# Patient Record
Sex: MALE | Race: BLACK OR AFRICAN AMERICAN | Employment: OTHER | URBAN - METROPOLITAN AREA
[De-identification: names, ages, dates, MRNs, and addresses within clinical notes are randomized per-mention and may not be internally consistent; named-entity substitution may affect disease eponyms.]

---

## 2017-12-05 PROBLEM — R10.31 RIGHT GROIN PAIN: Status: ACTIVE | Noted: 2017-12-05

## 2017-12-05 PROBLEM — R19.8 CHANGE IN BOWEL FUNCTION: Status: ACTIVE | Noted: 2017-12-05

## 2017-12-13 ENCOUNTER — HOSPITAL ENCOUNTER (OUTPATIENT)
Dept: CT IMAGING | Age: 52
Discharge: HOME | End: 2017-12-13
Attending: SURGERY
Payer: MEDICARE

## 2017-12-13 VITALS — HEIGHT: 67 IN | BODY MASS INDEX: 25.58 KG/M2 | WEIGHT: 163 LBS

## 2017-12-13 DIAGNOSIS — R10.11 RIGHT UPPER QUADRANT ABDOMINAL PAIN: ICD-10-CM

## 2017-12-13 DIAGNOSIS — K59.09 OTHER CONSTIPATION: ICD-10-CM

## 2017-12-13 DIAGNOSIS — R19.4 CHANGE IN BOWEL HABITS: ICD-10-CM

## 2017-12-13 PROCEDURE — 74011000258 HC RX REV CODE- 258: Performed by: SURGERY

## 2017-12-13 PROCEDURE — 74011636320 HC RX REV CODE- 636/320: Performed by: SURGERY

## 2017-12-13 PROCEDURE — 74177 CT ABD & PELVIS W/CONTRAST: CPT

## 2017-12-13 RX ORDER — SODIUM CHLORIDE 0.9 % (FLUSH) 0.9 %
10 SYRINGE (ML) INJECTION
Status: COMPLETED | OUTPATIENT
Start: 2017-12-13 | End: 2017-12-13

## 2017-12-13 RX ADMIN — SODIUM CHLORIDE 100 ML: 900 INJECTION, SOLUTION INTRAVENOUS at 09:52

## 2017-12-13 RX ADMIN — Medication 10 ML: at 09:52

## 2017-12-13 RX ADMIN — IOPAMIDOL 100 ML: 755 INJECTION, SOLUTION INTRAVENOUS at 09:52

## 2017-12-13 RX ADMIN — DIATRIZOATE MEGLUMINE AND DIATRIZOATE SODIUM 15 ML: 660; 100 LIQUID ORAL; RECTAL at 09:52

## 2020-02-11 ENCOUNTER — HOSPITAL ENCOUNTER (OUTPATIENT)
Dept: SLEEP MEDICINE | Age: 55
Discharge: HOME | End: 2020-02-11
Payer: MEDICARE

## 2020-02-11 PROCEDURE — 95811 POLYSOM 6/>YRS CPAP 4/> PARM: CPT

## 2020-02-17 PROBLEM — G47.10 HYPERSOMNOLENCE: Status: ACTIVE | Noted: 2020-02-17

## 2020-02-17 PROBLEM — G47.31 COMPLEX SLEEP APNEA SYNDROME: Status: ACTIVE | Noted: 2020-02-17

## 2020-12-31 PROBLEM — M54.12 CERVICAL RADICULAR PAIN: Status: ACTIVE | Noted: 2020-12-31

## 2020-12-31 PROBLEM — R10.31 RIGHT GROIN PAIN: Status: RESOLVED | Noted: 2017-12-05 | Resolved: 2020-12-31

## 2020-12-31 PROBLEM — K21.00 GASTROESOPHAGEAL REFLUX DISEASE WITH ESOPHAGITIS WITHOUT HEMORRHAGE: Status: ACTIVE | Noted: 2020-12-31

## 2020-12-31 PROBLEM — R35.1 BENIGN PROSTATIC HYPERPLASIA WITH NOCTURIA: Status: ACTIVE | Noted: 2020-12-31

## 2020-12-31 PROBLEM — N40.1 BENIGN PROSTATIC HYPERPLASIA WITH NOCTURIA: Status: ACTIVE | Noted: 2020-12-31

## 2020-12-31 PROBLEM — R19.8 CHANGE IN BOWEL FUNCTION: Status: RESOLVED | Noted: 2017-12-05 | Resolved: 2020-12-31

## 2020-12-31 PROBLEM — G47.10 HYPERSOMNOLENCE: Status: RESOLVED | Noted: 2020-02-17 | Resolved: 2020-12-31

## 2021-01-28 ENCOUNTER — HOSPITAL ENCOUNTER (OUTPATIENT)
Dept: GENERAL RADIOLOGY | Age: 56
Discharge: HOME | End: 2021-01-28
Payer: MEDICARE

## 2021-01-28 DIAGNOSIS — M25.551 RIGHT HIP PAIN: ICD-10-CM

## 2021-01-28 PROCEDURE — 73502 X-RAY EXAM HIP UNI 2-3 VIEWS: CPT

## 2021-02-25 NOTE — PROGRESS NOTES
X-ray of his hip shows some new calcifications adjacent to the right hip joint. This is similar to some lesions he had on previous study back in 2017. If he still having pain we can refer him to orthopedics for evaluation.

## 2021-04-08 ENCOUNTER — APPOINTMENT (OUTPATIENT)
Dept: PHYSICAL THERAPY | Age: 56
End: 2021-04-08

## 2021-04-21 ENCOUNTER — HOSPITAL ENCOUNTER (OUTPATIENT)
Dept: PHYSICAL THERAPY | Age: 56
Discharge: HOME | End: 2021-04-21
Payer: MEDICARE

## 2021-04-21 PROCEDURE — 97110 THERAPEUTIC EXERCISES: CPT

## 2021-04-21 PROCEDURE — 97140 MANUAL THERAPY 1/> REGIONS: CPT

## 2021-04-21 PROCEDURE — 97161 PT EVAL LOW COMPLEX 20 MIN: CPT

## 2021-04-21 NOTE — PROGRESS NOTES
Mitch Arias. : 1965  Primary: [place] Hou Medicare Hmo  Secondary:  [address] at [facility]  [address] [facility], [address] [place] [address], [address]  [phone]   [phone]      OUTPATIENT PHYSICAL THERAPY: Daily Treatment Note 2021    ICD-10: Treatment Diagnosis: Low Back Pain [M54.5]                Treatment Diagnosis 2: Right Hip Pain [M25.551]                Treatment Diagnosis 3: Generalized Muscle Weakness [M62.81]  Precautions: Hernia Repair  Allergies: Parafon forte dsc [chlorzoxazone]   TREATMENT PLAN:  Effective Dates: 2021 TO 2021 (60 days). Frequency/Duration: 2 times a week for 60 Day(s) MEDICAL/REFERRING DIAGNOSIS:  Low back pain [M54.5]  Trochanteric bursitis, right hip [M70.61]   DATE OF ONSET: Chronic Low Back and Right Hip Pain (started approximately 6-7 years ago)  REFERRING PHYSICIAN: Day Olguin MD MD Orders: Evaluate and Treat   Return MD Appointment: Per patient not currently scheduled. Pre-treatment Symptoms/Complaints:  See initial evaluation from today, reports increased pain with sitting, lying down specifically on right side. Pain: Initial: Pain Intensity 1: 6  Pain Location 1: Back, Hip  Pain Orientation 1: Right  Post Session:  4/10   Medications Last Reviewed:  2021  Updated Objective Findings:  See evaluation note from today   TREATMENT:   THERAPEUTIC EXERCISE: (24 minutes):  Exercises per grid below to improve mobility, strength, balance and coordination. Required moderate visual, verbal, manual and tactile cues to promote proper body alignment, promote proper body posture, promote proper body mechanics and promote proper body breathing techniques. Progressed resistance, range, repetitions and complexity of movement as indicated.      Date:  2021 Date:   Date:     Activity/Exercise Parameters Parameters Parameters   Lumbar Rotation S/L x 30 reps     Lumbar Rotation  Supine x 30 reps SKTC 6 x 30 sec Each Leg     Piriformis 6 x 30 sec Each Leg                         Time spent with patient reviewing proper muscle recruitment and technique with exercises. Therapeutic Neuroscience Education, Sensitive Nerve System 10 minutes    MANUAL THERAPY: (14 minutes): Joint mobilization, Soft tissue mobilization and Manipulation was utilized and necessary because of the patient's restricted joint motion, painful spasm, loss of articular motion and restricted motion of soft tissue   Lumbar Rotation Right and Left Sidelying Grade II/III/IV/V    MODALITIES: (0 minutes):      None Today     HEP: As above; handouts given to patient for all exercises. Treatment/Session Summary:    · Response to Treatment:  Patient demonstrated improved quality of movement with treatment. He was instructed regarding an initial home exercise program. He would benefit from continued progression per tolerance to improve functional ability. · Communication/Consultation:  Home Exercise Program  · Equipment provided today:  None today  · Recommendations/Intent for next treatment session: Next visit will focus on mobility, flexibility, range of motion, strength, and functional ability.     Total Treatment Billable Duration:  58 minutes: 20 minutes evaluation, 24 minutes therapeutic exercise, 14 minutes manual therapy  PT Patient Time In/Time Out  Time In: 1330  Time Out: Anjumu 59, PT

## 2021-04-21 NOTE — THERAPY EVALUATION
Mitch Chavez. : 1965  Primary: [place] Hmo  Secondary:  [address] at [facility]  [address] [place], [address] [place] [address], [facility], [address] [place] [address]  [phone]   [phone]       OUTPATIENT PHYSICAL THERAPY:Initial Assessment 2021    ICD-10: Treatment Diagnosis: Low Back Pain [M54.5]                Treatment Diagnosis 2: Right Hip Pain [M25.551]                Treatment Diagnosis 3: Generalized Muscle Weakness [M62.81]  Precautions: Hernia Repair  Allergies: Parafon forte dsc [chlorzoxazone]   TREATMENT PLAN:  Effective Dates: 2021 TO 2021 (60 days). Frequency/Duration: 2 times a week for 60 Day(s) MEDICAL/REFERRING DIAGNOSIS:  Low back pain [M54.5]  Trochanteric bursitis, right hip [M70.61]   DATE OF ONSET: Chronic Low Back and Right Hip Pain (started approximately 6-7 years ago)  REFERRING PHYSICIAN: Maudry Klinefelter, MD MD Orders: Evaluate and Treat   Return MD Appointment: Per patient not currently scheduled. INITIAL ASSESSMENT:  Mr. Rosamaria Elizabeth is a 60 y.o. male presenting to physical therapy with complaints of right low back pain, right hip pain pain radiates down to posterior thigh, also around to front of hip/thigh to inside of his right knee. He reports having pain for the last approximately 6-7 years ago, denies any mechanism of injury. He reports in the last 8 months he has experienced increased pain and also numbness right posterior/lateral thigh. He reports that he has trouble driving, right thigh goes numb. He reports trouble sitting for any length of time. He reports trouble sleeping wakes up at least 3-4 times per night. He reports unable to lie on his right side. Patient presents with increased pain, decreased strength, decreased ROM, decreased flexibility, impaired gait, impaired posture, impaired transfer ability, decreased activity tolerance, and overall impaired functional mobility.  Patient is a good candidate for skilled physical therapy interventions to include manual therapy, therapeutic exercise, balance training, gait training, transfer training, postural re-education, body mechanics training, and pain modalities and trigger point dry needling as needed. PROBLEM LIST (Impacting functional limitations):  1. Decreased Strength  2. Decreased ADL/Functional Activities  3. Decreased Transfer Abilities  4. Decreased Ambulation Ability/Technique  5. Increased Pain  6. Decreased Activity Tolerance  7. Decreased Pacing Skills  8. Decreased Flexibility/Joint Mobility  9. Decreased [place] with Home Exercise Program INTERVENTIONS PLANNED: (Treatment may consist of any combination of the following)  1. Bed Mobility  2. Cryotherapy  3. Electrical Stimulation  4. Gait Training  5. Heat  6. Home Exercise Program (HEP)  7. Manual Therapy  8. Neuromuscular Re-education/Strengthening  9. Range of Motion (ROM)  10. Therapeutic Activites  11. Therapeutic Exercise/Strengthening  12. Transcutaneous Electrical Nerve Stimulation (TENS)  13. Ultrasound (US)  14. Trigger Point Dry Needling (TPDN)     GOALS: (Goals have been discussed and agreed upon with patient.)  Short-Term Functional Goals: Time Frame: 4/21/2021 to 5/20/2021  1. Patient demonstrates independence with home exercise program without verbal cueing provided by therapist.   2. Patient will report no more than 2/10 low back pain at rest in order to demonstrate improved self pain control and tolerance and improved quality of sleep. 3. Patient will be educated in and demonstrate improved upright posture including to assist with improve tolerance with sitting and driving. .   4. Patient will be educated in and demonstrate proper squat lift technique in order to reduce stress on low back and hips, improve safety, and reduce risk of injury.   5. Patient will improve lumbar flexion to 40 degrees to assist with bending, squatting, lifting, dressing, bathing with decreased pain and improved ability. Discharge Goals: Time Frame: 4/21/2021 to 6/20/2021  1. Patient will improve lumbar flexion to 50 degrees to assist with decreased pain and improved function when lifting and carrying. 2. Patient will improve strength to 4+/5 to assist with ability to achieve and maintain neutral posture sitting posture to improve ability to sit and drive for prolonged periods. 3. Patient jhonny improve lumbar extension to 30 degrees to assist with ability to achieve and maintain more upright neutral posture to improve sitting and driving. 4. Patient will improve Modified Oswestry Scale score to 10/50 from 21/50. Outcome Measure: Tool Used: Modified Oswestry Low Back Pain Questionnaire  Score:  Initial: 21/50  Most Recent: X/50 (Date: -- )   Interpretation of Score: Each section is scored on a 0-5 scale, 5 representing the greatest disability. The scores of each section are added together for a total score of 50. Medical Necessity:   · Patient is expected to demonstrate progress in strength, range of motion, coordination and functional technique to decreased pain with sitting, driving, sleeping and improve function with lifting, sitting, sleeping, driving, and all daily functional activities. · Skilled intervention continues to be required due to increased pain, decreased posture, decreased mobility, flexibility, range of motion, strength, and functional ability. Reason for Services/Other Comments:  · Patient continues to require skilled intervention due to increased pain, impaired sleep quality and functional abilities, along with increasing complexity of exercise. Total Evaluation Duration: 20 minutes    Rehabilitation Potential For Stated Goals: Good  Regarding Marsha Fernandes Jr.'s therapy, I certify that the treatment plan above will be carried out by a therapist or under their direction.   Thank you for this referral,  Karen Son PT     Referring Physician Signature: Sabas Francois Skip Halsted, MD _______________________________ Date _____________             PAIN/SUBJECTIVE:    Initial: Pain Intensity 1: 6  Pain Location 1: Back, Hip  Pain Orientation 1: Right  Post Session:  4/10    HISTORY:    History of Injury/Illness (Reason for Referral):  Mr. Burgess De Leon is a 60 y.o. male presenting to physical therapy with complaints of right low back pain, right hip pain pain radiates down to posterior thigh, also around to front of hip/thigh to inside of his right knee. He reports having pain for the last approximately 6-7 years ago, denies any mechanism of injury. He reports in the last 8 months he has experienced increased pain and also numbness right posterior/lateral thigh. He reports that he has trouble driving, right thigh goes numb. He reports trouble sitting for any length of time. He reports trouble sleeping wakes up at least 3-4 times per night. He reports unable to lie on his right side. Past Medical History/Comorbidities:   Mr. Burgess De Leon  has a past medical history of Chronic pain, Difficult intubation, Diverticulosis of colon (2016), Dyspnea (4/3/2013), GERD (gastroesophageal reflux disease), Hypercholesterolemia, Insomnia, Muscle spasm, Psychiatric disorder, Unspecified sleep apnea, and Urinary frequency. He also has no past medical history of Malignant hyperthermia due to anesthesia, Nausea & vomiting, Pseudocholinesterase deficiency, or Unspecified adverse effect of anesthesia. Mr. Burgess De Leon  has a past surgical history that includes hx tracheostomy; hx carpal tunnel release (Right); hx carpal tunnel release (Bilateral); colonoscopy (N/A, 9/21/2016); hx hernia repair (Right); hx heent (last 1/2004); and hx colonoscopy (09/2017). Social History/Living Environment:     Patient lives with his wife. He has stairs, steps that he has to be able to navigate at home. Prior Level of Function/Work/Activity:  Patient was independent without dysfunction. Patient is retired.  He is eager to return to pain free life and be able to improve his sleep quality and therefore his overall health quality. Dominant Side:         RIGHT    Ambulatory/Rehab Services H2 Model Falls Risk Assessment    Risk Factors:       (1)  Gender [Male] Ability to Rise from Chair:       (1)  Pushes up, successful in one attempt    Falls Prevention Plan:       No modifications necessary    Total: (5 or greater = High Risk): 2    ©2010 [facility] of [employer]. All Rights Reserved. [facility] [employer] Patent #[id number]. Federal Law prohibits the replication, distribution or use without written permission from [facility] [employer]    Current Medications:        Current Outpatient Medications:     lansoprazole (PREVACID) 30 mg capsule, TAKE 1 CAPSULE BY MOUTH EVERY MORNING BEFORE BREAKFAST, Disp: 90 Cap, Rfl: 1    varicella-zoster recombinant, PF, (Shingrix, PF,) 50 mcg/0.5 mL susr injection, 0.5mL by IntraMUSCular route once now and then repeat in 2-6 months, Disp: 0.5 mL, Rfl: 1    LORazepam (ATIVAN) 0.5 mg tablet, Take 1 Tab by mouth every eight (8) hours as needed for Anxiety. Max Daily Amount: 1.5 mg., Disp: 30 Tab, Rfl: 2    tamsulosin (FLOMAX) 0.4 mg capsule, TAKE 1 CAPSULE BY MOUTH EVERY DAY, Disp: 90 Cap, Rfl: 1    carisoprodoL (Soma) 350 mg tablet, Take 1 Tab by mouth daily as needed (shoulder pain). , Disp: 30 Tab, Rfl: 1    ibuprofen (MOTRIN) 800 mg tablet, Take 800 mg by mouth every eight (8) hours as needed. , Disp: , Rfl:     Date Last Reviewed:  4/21/2021    Number of Personal Factors/Comorbidities that affect the Plan of Care:   1-2: MODERATE COMPLEXITY    EXAMINATION:    Patient denies any increase of symptoms with cough, sneeze or valsalva. Patient denies any saddle paresthesia or bowel/bladder deficits. Patient denies any headaches, changes in vision, dizziness, vertigo, nausea, drop attacks, black outs, tinnitus, dysphagia, dysarthria, LE symptoms or bowel/bladder dysfunction.     Observation/Orthostatic Postural Assessment: Patient demonstrates decreased lumbar lordosis, anterior pelvic tilt. Palpation:          Patient presents with right lumbar paraspinal, right gluteal rosalino, medius, minimus, piriformis, quadriceps, hamstrings (lateral greater medial) severe tightness, tenderness, trigger points. ROM:            AROM (degrees)   Lumbar Flexion 31   Lumbar Extension 14   Lumbar Right Sidebend 24   Lumbar Left Sidebend 28     AROM/ PROM Left (degrees) Right (degrees)   Hip Flexion 110 115   Hip Extension 0 0   Hip Internal Rotation   (90 degrees flexion) 20 20   Hip External Rotation   (90 degrees flexion) 40 35     Strength: Motion Tested Left   (*/5) Right  (*/5)   Hip Flexion 5 4   Hip Extension 3 3   Hip Abduction 4+ 3   Knee Flexion 5 4+   Knee Extension 5 5   Ankle Dorsiflexion 5 5   Ankle Plantarflexion 5 5   Gross core strength 2/5  Special Tests:          Neural tension tests: Passive straight leg raise (SLR) test is Negative, Active straight leg raise negative right 65 degrees left 71 degrees. Crossed SLR test is negative. Slump test is negative. Femoral nerve stretch test is negative. Passive Accessory Motion:         Thoracic and Lumbar, Right Hip Hypomobility All Directions  Neurological Screen:              Myotomes: Key muscle strength testing through bilateral LE is intact deficits noted above. Dermatomes: Sensation to light touch for bilateral LE is intact from L3 to S1. Reflexes: Patellar (L3/ L4): Intact, Normal, Equal                 Achilles (S1/ S2): Intact, Normal, Equal           Functional Mobility:         Gait/Ambulation:  Patient demonstrated decreased trunk rotation and right hip extension. Transfers:  Patient required 100% of upper extremity assistance and reported increased pain with all transfers. Balance:          Patient denies falls or trouble with balance. Body Structures Involved:  1. Bones  2. Joints  3. Muscles  4.  Ligaments Body Functions Affected:  1. Sensory/Pain  2. Neuromusculoskeletal  3. Movement Related Activities and Participation Affected:  1. General Tasks and Demands  2. Mobility  3. Self Care  4. Domestic Life  5. Interpersonal Interactions and Relationships  6.  Community, Social and [place]    Number of elements (examined above) that affect the Plan of Care: 3: MODERATE COMPLEXITY    CLINICAL PRESENTATION:    Presentation:   Stable and uncomplicated: LOW COMPLEXITY    CLINICAL DECISION MAKING:    Use of outcome tool(s) and clinical judgement create a POC that gives a: Questionable prediction of patient's progress: MODERATE COMPLEXITY

## 2021-04-26 ENCOUNTER — HOSPITAL ENCOUNTER (OUTPATIENT)
Dept: PHYSICAL THERAPY | Age: 56
Discharge: HOME | End: 2021-04-26
Payer: MEDICARE

## 2021-04-26 PROCEDURE — 97110 THERAPEUTIC EXERCISES: CPT

## 2021-04-26 PROCEDURE — 97140 MANUAL THERAPY 1/> REGIONS: CPT

## 2021-04-26 NOTE — PROGRESS NOTES
Mitch Silver. : 1965  Primary: [place] [employer] Hmo  Secondary:  [address] at [facility]  [address] [facility], [address] [place] [address], [address]  [phone]   [phone]      OUTPATIENT PHYSICAL THERAPY: Daily Treatment Note 2021    ICD-10: Treatment Diagnosis: Low Back Pain [M54.5]                Treatment Diagnosis 2: Right Hip Pain [M25.551]                Treatment Diagnosis 3: Generalized Muscle Weakness [M62.81]  Precautions: Hernia Repair  Allergies: Parafon forte dsc [chlorzoxazone]   TREATMENT PLAN:  Effective Dates: 2021 TO 2021 (60 days). Frequency/Duration: 2 times a week for 60 Day(s) MEDICAL/REFERRING DIAGNOSIS:  Low back pain [M54.5]  Trochanteric bursitis, right hip [M70.61]   DATE OF ONSET: Chronic Low Back and Right Hip Pain (started approximately 6-7 years ago)  REFERRING PHYSICIAN: Dimitris Garcia MD MD Orders: Evaluate and Treat   Return MD Appointment: Per patient not currently scheduled. Pre-treatment Symptoms/Complaints:  Patient reports no symptoms today. Right lower extremity pain and numbness with prolonged sitting is main C/O. Pain: Initial: Pain Intensity 1: 0  Pain Location 1: Back, Hip, Leg  Pain Orientation 1: Right  Post Session:  0/10   Medications Last Reviewed:  2021  Updated Objective Findings:  None Today   TREATMENT:   THERAPEUTIC EXERCISE: (28 minutes):  Exercises per grid below to improve mobility, strength, balance and coordination. Required moderate visual, verbal, manual and tactile cues to promote proper body alignment, promote proper body posture, promote proper body mechanics and promote proper body breathing techniques. Progressed resistance, range, repetitions and complexity of movement as indicated.      Date:  2021 Date:  21 Date:     Activity/Exercise Parameters Parameters Parameters   Lumbar Rotation S/L x 30 reps SL 2 x 30    Lumbar Rotation  Supine x 30 reps Supine 2 x 30    SKTC 6 x 30 sec Each Leg 6 x 30 sec    Piriformis 6 x 30 sec Each Leg 6 x 30 sec    Hamstring stretch  Strap 4 x 30 sec                  Time spent with patient reviewing proper muscle recruitment and technique with exercises. Therapeutic Neuroscience Education, Sensitive Nerve System 10 minutes    MANUAL THERAPY: (25 minutes): Joint mobilization, Soft tissue mobilization and Manipulation was utilized and necessary because of the patient's restricted joint motion, painful spasm, loss of articular motion and restricted motion of soft tissue   Lumbar Rotation Right and Left Sidelying Grade II/III/IV/V Not today   Soft tissue mobilization to right lumbosacral region,lateral hip and IT band left side lying   Tiger tail rolling right IT band    MODALITIES: (0 minutes):      None Today     HEP: As above; handouts given to patient for all exercises. Treatment/Session Summary:    · Response to Treatment:  Patient performed exercises well. · Communication/Consultation:  Home Exercise Program  · Equipment provided today:  None today  · Recommendations/Intent for next treatment session: Next visit will focus on mobility, flexibility, range of motion, strength, and functional ability.     Total Treatment Billable Duration:  53 minutes:   PT Patient Time In/Time Out  Time In: 1228  Time Out: 1139 Jake Richmond PTA

## 2021-05-04 ENCOUNTER — HOSPITAL ENCOUNTER (OUTPATIENT)
Dept: PHYSICAL THERAPY | Age: 56
Discharge: HOME | End: 2021-05-04
Payer: MEDICARE

## 2021-05-04 PROCEDURE — 97140 MANUAL THERAPY 1/> REGIONS: CPT

## 2021-05-04 PROCEDURE — 97110 THERAPEUTIC EXERCISES: CPT

## 2021-05-04 NOTE — PROGRESS NOTES
Mitch Jeffries. : 1965  Primary: [place] Hou Medicare Hmo  Secondary:  [address] at [facility]  [address], [address], [address] [place] [address] Street  [phone]   [phone]      OUTPATIENT PHYSICAL THERAPY: Daily Treatment Note 2021    ICD-10: Treatment Diagnosis: Low Back Pain [M54.5]                Treatment Diagnosis 2: Right Hip Pain [M25.551]                Treatment Diagnosis 3: Generalized Muscle Weakness [M62.81]  Precautions: Hernia Repair  Allergies: Parafon forte dsc [chlorzoxazone]   TREATMENT PLAN:  Effective Dates: 2021 TO 2021 (60 days). Frequency/Duration: 2 times a week for 60 Day(s) MEDICAL/REFERRING DIAGNOSIS:  Low back pain [M54.5]  Trochanteric bursitis, right hip [M70.61]   DATE OF ONSET: Chronic Low Back and Right Hip Pain (started approximately 6-7 years ago)  REFERRING PHYSICIAN: Megan Linares MD MD Orders: Evaluate and Treat   Return MD Appointment: Per patient not currently scheduled. Pre-treatment Symptoms/Complaints:  Patient reports \"everything has improved. \" Patient reports that he notices when he does his HEP he has not pain or symptoms. He reports that he noticed his HEP was helping when he had one episode where he did not do them for a couple of days and his pain and symptoms returned. Pain: Initial: Pain Intensity 1: 0  Pain Location 1: Back, Hip, Leg  Post Session:  0/10   Medications Last Reviewed:  2021  Updated Objective Findings:  Mobility: Lumbar Hypomobility. TREATMENT:   THERAPEUTIC EXERCISE: (30 minutes):  Exercises per grid below to improve mobility, strength, balance and coordination. Required moderate visual, verbal, manual and tactile cues to promote proper body alignment, promote proper body posture, promote proper body mechanics and promote proper body breathing techniques. Progressed resistance, range, repetitions and complexity of movement as indicated. Date:  4/21/2021 Date:  4-26-21 Date:  5/4/2021   Activity/Exercise Parameters Parameters Parameters   Lumbar Rotation S/L x 30 reps SL 2 x 30 S/L x 30 reps   Lumbar Rotation  Supine x 30 reps Supine 2 x 30 Supine x 30 reps   SKTC 6 x 30 sec Each Leg 6 x 30 sec 6 x 30 sec   Piriformis 6 x 30 sec Each Leg 6 x 30 sec 6 x 30 sec   Hamstring stretch  Strap 4 x 30 sec    Calf Stretch   6 x 30 sec   Clams   3 x 10 Yellow   Bridges   3 x 10 Yellow   Anti Rotation   Shoulder Extension 3 x 10 Red   Anti Rotation   Side Step 3 x 10 Red           Time spent with patient reviewing proper muscle recruitment and technique with exercises. MANUAL THERAPY: (25 minutes): Joint mobilization, Soft tissue mobilization and Manipulation was utilized and necessary because of the patient's restricted joint motion, painful spasm, loss of articular motion and restricted motion of soft tissue   Lumbar Rotation Right and Left Sidelying Grade II/III/IV/V    Soft tissue mobilization to right lumbosacral region,lateral hip and IT band left side lying    MODALITIES: (0 minutes):      None Today     HEP: As above; handouts given to patient for all exercises. Treatment/Session Summary:    · Response to Treatment:  Patient continues to have lumbar hypomobility, but overall has shown mobility improvements. He continues to have limitations in core activation and strength. He required verbal cues and demonstration for posture, form, and mechanics. He would benefit from continued progression of strength and stability to progress functionally. · Communication/Consultation:  Home Exercise Program  · Equipment provided today:  None today  · Recommendations/Intent for next treatment session: Next visit will focus on mobility, flexibility, range of motion, strength, and functional ability.     Total Treatment Billable Duration:  55 minutes:   PT Patient Time In/Time Out  Time In: 0800  Time Out: [address], PT

## 2021-05-18 ENCOUNTER — HOSPITAL ENCOUNTER (OUTPATIENT)
Dept: PHYSICAL THERAPY | Age: 56
Discharge: HOME | End: 2021-05-18
Payer: MEDICARE

## 2021-05-18 PROCEDURE — 97140 MANUAL THERAPY 1/> REGIONS: CPT

## 2021-05-18 PROCEDURE — 97110 THERAPEUTIC EXERCISES: CPT

## 2021-05-18 NOTE — PROGRESS NOTES
Mitch Ly Base. : 1965  Primary: Jodeen Closs Hum[employer] Medicare Hmo  Secondary:  [address] at [facility]  [address] [facility], [address] [place] [address], [address]  [phone]   [phone]      OUTPATIENT PHYSICAL THERAPY: Daily Treatment Note 2021    ICD-10: Treatment Diagnosis: Low Back Pain [M54.5]                Treatment Diagnosis 2: Right Hip Pain [M25.551]                Treatment Diagnosis 3: Generalized Muscle Weakness [M62.81]  Precautions: Hernia Repair  Allergies: Parafon forte dsc [chlorzoxazone]   TREATMENT PLAN:  Effective Dates: 2021 TO 2021 (60 days). Frequency/Duration: 2 times a week for 60 Day(s) MEDICAL/REFERRING DIAGNOSIS:  Low back pain [M54.5]  Trochanteric bursitis, right hip [M70.61]   DATE OF ONSET: Chronic Low Back and Right Hip Pain (started approximately 6-7 years ago)  REFERRING PHYSICIAN: Homero Scott MD MD Orders: Evaluate and Treat   Return MD Appointment: Per patient not currently scheduled. Pre-treatment Symptoms/Complaints:  Patient reports this past weekend he moved a piece of furniture on Friday night and then played golf Saturday and by  he felt he has re-aggravated it. Pain: Initial: Pain Intensity 1: 7  Pain Location 1: Back, Hip, Leg  Post Session:  0/10   Medications Last Reviewed:  2021  Updated Objective Findings:  Mobility: Lumbar Hypomobility. Palpation: Right Piriformis and Gluteal Medius and Minimus Tenderness, Tightness, Trigger Points. TREATMENT:   THERAPEUTIC EXERCISE: (30 minutes):  Exercises per grid below to improve mobility, strength, balance and coordination. Required moderate visual, verbal, manual and tactile cues to promote proper body alignment, promote proper body posture, promote proper body mechanics and promote proper body breathing techniques. Progressed resistance, range, repetitions and complexity of movement as indicated.      Date:  2021 Date:  21 Date:  5/4/2021   Activity/Exercise Parameters Parameters Parameters   Lumbar Rotation S/L x 30 reps SL 2 x 30 S/L x 30 reps   Lumbar Rotation  Supine x 30 reps Supine 2 x 30 Supine x 30 reps   SKTC 6 x 30 sec Each Leg 6 x 30 sec 6 x 30 sec   Piriformis 6 x 30 sec Each Leg 6 x 30 sec 6 x 30 sec   Hamstring stretch  Strap 4 x 30 sec    Calf Stretch 6 x 30 sec  6 x 30 sec   Clams 3 x 10  3 x 10 Yellow   Bridges 3 x 10   3 x 10 Yellow   Anti Rotation Shoulder Extension 3 x 10 Red  Shoulder Extension 3 x 10 Red   Anti Rotation Side Step 3 x 10 Red  Side Step 3 x 10 Red   Nu Step 6 minutes Working on ROM       Time spent with patient reviewing proper muscle recruitment and technique with exercises. MANUAL THERAPY: (25 minutes): Joint mobilization, Soft tissue mobilization and Manipulation was utilized and necessary because of the patient's restricted joint motion, painful spasm, loss of articular motion and restricted motion of soft tissue   Lumbar Rotation Right and Left Sidelying Grade II/III/IV/V    Soft tissue mobilization to right lumbosacral region,lateral hip and IT band left side lying    MODALITIES: (0 minutes):      None Today     HEP: As above; handouts given to patient for all exercises. Treatment/Session Summary:    · Response to Treatment:  Patient continues to have lumbar hypomobility and right hip soft tissue mobility limitations. He continues to have limitations in core activation and strength. He required verbal cues and demonstration for posture, form, and mechanics. He would benefit from continued progression of strength and stability to progress functionally. · Communication/Consultation:  Home Exercise Program  · Equipment provided today:  None today  · Recommendations/Intent for next treatment session: Next visit will focus on mobility, flexibility, range of motion, strength, and functional ability.     Total Treatment Billable Duration:  55 minutes:   PT Patient Time In/Time Out  Time In: 0800  Time Out: Giovanni Byers 84, PT

## 2021-05-25 ENCOUNTER — HOSPITAL ENCOUNTER (OUTPATIENT)
Dept: PHYSICAL THERAPY | Age: 56
Discharge: HOME | End: 2021-05-25
Payer: MEDICARE

## 2021-05-25 PROCEDURE — 97140 MANUAL THERAPY 1/> REGIONS: CPT

## 2021-05-25 PROCEDURE — 97110 THERAPEUTIC EXERCISES: CPT

## 2021-05-25 NOTE — PROGRESS NOTES
Mitch Archer. : 1965  Primary: Sukhwinder Hou Medicare Hmo  Secondary:  [address] at [facility]  [address] [place], [address] [place] [address], [address]  [phone]   [phone]      OUTPATIENT PHYSICAL THERAPY: Daily Treatment Note 2021    ICD-10: Treatment Diagnosis: Low Back Pain [M54.5]                Treatment Diagnosis 2: Right Hip Pain [M25.551]                Treatment Diagnosis 3: Generalized Muscle Weakness [M62.81]  Precautions: Hernia Repair  Allergies: Parafon forte dsc [chlorzoxazone]   TREATMENT PLAN:  Effective Dates: 2021 TO 2021 (60 days). Frequency/Duration: 2 times a week for 60 Day(s) MEDICAL/REFERRING DIAGNOSIS:  Low back pain [M54.5]  Trochanteric bursitis, right hip [M70.61]   DATE OF ONSET: Chronic Low Back and Right Hip Pain (started approximately 6-7 years ago)  REFERRING PHYSICIAN: Chasidy Kwan MD MD Orders: Evaluate and Treat   Return MD Appointment: Per patient not currently scheduled. Pre-treatment Symptoms/Complaints:  Patient reports he is feeling good, trace pain about a 2/10 but is intermittent. Pain: Initial: Pain Intensity 1: 2  Pain Location 1: Back, Hip, Leg  Post Session:  0/10   Medications Last Reviewed:  2021  Updated Objective Findings:  Mobility: Lumbar Hypomobility. TREATMENT:   THERAPEUTIC EXERCISE: (48 minutes):  Exercises per grid below to improve mobility, strength, balance and coordination. Required moderate visual, verbal, manual and tactile cues to promote proper body alignment, promote proper body posture, promote proper body mechanics and promote proper body breathing techniques. Progressed resistance, range, repetitions and complexity of movement as indicated.      Date:  2021 Date:  2021 Date:  2021   Activity/Exercise Parameters Parameters Parameters   Lumbar Rotation S/L x 30 reps S/L x 30 reps S/L x 30 reps   Lumbar Rotation  Supine x 30 reps Supine x 30 reps Supine x 30 reps   SKTC 6 x 30 sec Each Leg 6 x 30 sec Each Leg 6 x 30 sec   Piriformis 6 x 30 sec Each Leg 6 x 30 sec Each Leg 6 x 30 sec   Hamstring stretch      Calf Stretch 6 x 30 sec 6 x 30 sec 6 x 30 sec   Clams 3 x 10 3 x 12 3 x 10 Yellow   Bridges 3 x 10  3 x 12 3 x 10 Yellow   Anti Rotation Shoulder Extension 3 x 10 Red Shoulder Extension 3 x 10 Red Shoulder Extension 3 x 10 Red   Anti Rotation Side Step 3 x 10 Red Side Step 3 x 10 Red Side Step 3 x 10 Red   Nu Step 6 minutes Working on ROM 8 minutes Working on ROM    TRX  2 x 10 Flexion/Extension                  Time spent with patient reviewing proper muscle recruitment and technique with exercises. MANUAL THERAPY: (8 minutes): Joint mobilization, Soft tissue mobilization and Manipulation was utilized and necessary because of the patient's restricted joint motion, painful spasm, loss of articular motion and restricted motion of soft tissue   Lumbar Rotation Right and Left Sidelying Grade II/III/IV/V    Soft tissue mobilization to right lumbosacral region,lateral hip and IT band left side lying    MODALITIES: (0 minutes):      None Today     HEP: As above; handouts given to patient for all exercises. Treatment/Session Summary:    · Response to Treatment:  Patient showed improved mobility and flexibility, and progress with muscle activation and strength. He required verbal cues and intermittent tactile cues for posture, form, and mechanics. He would benefit from continued progression of muscle activation and strength to progress functionally. · Communication/Consultation:  Home Exercise Program  · Equipment provided today:  None today  · Recommendations/Intent for next treatment session: Next visit will focus on mobility, flexibility, range of motion, strength, and functional ability.     Total Treatment Billable Duration:  55 minutes:   PT Patient Time In/Time Out  Time In: 3805  Time Out: 30 KD Fitzgerald PT

## 2021-06-03 ENCOUNTER — HOSPITAL ENCOUNTER (OUTPATIENT)
Dept: PHYSICAL THERAPY | Age: 56
Discharge: HOME | End: 2021-06-03
Payer: MEDICARE

## 2021-06-03 PROCEDURE — 97110 THERAPEUTIC EXERCISES: CPT

## 2021-06-03 PROCEDURE — 97140 MANUAL THERAPY 1/> REGIONS: CPT

## 2021-06-03 NOTE — PROGRESS NOTES
Mitch Fonseca. : 1965  Primary: Emre Hou Medicare o  Secondary:  [address] at [facility]  [address] [facility], [address] [place] [address], [address]  [phone]   [phone]      OUTPATIENT PHYSICAL THERAPY: Daily Treatment Note 6/3/2021    ICD-10: Treatment Diagnosis: Low Back Pain [M54.5]                Treatment Diagnosis 2: Right Hip Pain [M25.551]                Treatment Diagnosis 3: Generalized Muscle Weakness [M62.81]  Precautions: Hernia Repair  Allergies: Parafon forte dsc [chlorzoxazone]   TREATMENT PLAN:  Effective Dates: 2021 TO 2021 (60 days). Frequency/Duration: 2 times a week for 60 Day(s) MEDICAL/REFERRING DIAGNOSIS:  Low back pain [M54.5]  Trochanteric bursitis, right hip [M70.61]   DATE OF ONSET: Chronic Low Back and Right Hip Pain (started approximately 6-7 years ago)  REFERRING PHYSICIAN: Juliet Dunham MD MD Orders: Evaluate and Treat   Return MD Appointment: Per patient not currently scheduled. Pre-treatment Symptoms/Complaints:  Patient reports significant improvement since starting therapy. States the exercises really help when he is consistent doing them     Pain: Initial: Pain Intensity 1: 2  Pain Location 1: Back, Hip  Pain Orientation 1: Right  Post Session:  1/10   Medications Last Reviewed:  6/3/2021  Updated Objective Findings:  Mobility: Lumbar Hypomobility. TREATMENT:   THERAPEUTIC EXERCISE: (43 minutes):  Exercises per grid below to improve mobility, strength, balance and coordination. Required moderate visual, verbal, manual and tactile cues to promote proper body alignment, promote proper body posture, promote proper body mechanics and promote proper body breathing techniques. Progressed resistance, range, repetitions and complexity of movement as indicated.      Date:  2021 Date:  2021 Date:  6-3-21   Activity/Exercise Parameters Parameters Parameters   Lumbar Rotation S/L x 30 reps S/L x 30 reps S/L x 30 reps   Lumbar Rotation  Supine x 30 reps Supine x 30 reps Supine x 30 reps   SKTC 6 x 30 sec Each Leg 6 x 30 sec Each Leg    Piriformis 6 x 30 sec Each Leg 6 x 30 sec Each Leg 6 x 30 sec   Hamstring stretch      Calf Stretch 6 x 30 sec 6 x 30 sec 6 x 30 sec   Clams 3 x 10 3 x 12    Bridges 3 x 10  3 x 12 3 x 10    Anti Rotation Shoulder Extension 3 x 10 Red Shoulder Extension 3 x 10 Red Shoulder Extension 3 x 10 green    Anti Rotation Side Step 3 x 10 Red Side Step 3 x 10 Red Side Step 3 x 10 green    Nu Step 6 minutes Working on ROM 8 minutes Working on ROM 10 minutes level 4   TRX  2 x 10 Flexion/Extension 2 x 10 flex/ext                 Time spent with patient reviewing proper muscle recruitment and technique with exercises. MANUAL THERAPY: (10 minutes): Joint mobilization, Soft tissue mobilization and Manipulation was utilized and necessary because of the patient's restricted joint motion, painful spasm, loss of articular motion and restricted motion of soft tissue   Lumbar Rotation Right and Left Sidelying Grade II/III/IV/V not today   Soft tissue mobilization to right lumbosacral region,lateral hip and IT band left side lying    MODALITIES: (0 minutes):      None Today     HEP: As above; handouts given to patient for all exercises. Treatment/Session Summary:    · Response to Treatment:  Patient reports decreased pain and stiffness after session. Requires cues for core exercises especially with bands. · Communication/Consultation:  Home Exercise Program  · Equipment provided today:  None today  · Recommendations/Intent for next treatment session: Next visit will focus on mobility, flexibility, range of motion, strength, and functional ability.     Total Treatment Billable Duration:  53 minutes:   PT Patient Time In/Time Out  Time In: 0800  Time Out: 5[address] [facility]

## 2021-06-08 ENCOUNTER — HOSPITAL ENCOUNTER (OUTPATIENT)
Dept: PHYSICAL THERAPY | Age: 56
Discharge: HOME | End: 2021-06-08
Payer: MEDICARE

## 2021-06-08 PROCEDURE — 97110 THERAPEUTIC EXERCISES: CPT

## 2021-06-08 NOTE — PROGRESS NOTES
Mitch Brady. : 1965  Primary: Shaniko Puls Humana Medicare Hmo  Secondary:  [place] [facility] at [facility]  [address] [facility], [address] [place] [address], [address]  [phone]   [phone]      OUTPATIENT PHYSICAL THERAPY: Daily Treatment Note 2021    ICD-10: Treatment Diagnosis: Low Back Pain [M54.5]                Treatment Diagnosis 2: Right Hip Pain [M25.551]                Treatment Diagnosis 3: Generalized Muscle Weakness [M62.81]  Precautions: Hernia Repair  Allergies: Parafon forte dsc [chlorzoxazone]   TREATMENT PLAN:  Effective Dates: 2021 TO 2021 (60 days). Frequency/Duration: 2 times a week for 60 Day(s) MEDICAL/REFERRING DIAGNOSIS:  Low back pain [M54.5]  Trochanteric bursitis, right hip [M70.61]   DATE OF ONSET: Chronic Low Back and Right Hip Pain (started approximately 6-7 years ago)  REFERRING PHYSICIAN: Nicolle Waldrop MD MD Orders: Evaluate and Treat   Return MD Appointment: Per patient not currently scheduled. Pre-treatment Symptoms/Complaints:  Patient reports that he is feeling good. Pain: Initial: Pain Intensity 1: 0  Pain Location 1: Back, Hip  Pain Orientation 1: Right  Post Session:  0/10   Medications Last Reviewed:  2021  Updated Objective Findings:  Manual Muscle Test: Hip Abduction Right 4/5 Left 4+/5. TREATMENT:   THERAPEUTIC EXERCISE: (55 minutes):  Exercises per grid below to improve mobility, strength, balance and coordination. Required moderate visual, verbal, manual and tactile cues to promote proper body alignment, promote proper body posture, promote proper body mechanics and promote proper body breathing techniques. Progressed resistance, range, repetitions and complexity of movement as indicated.      Date:  2021 Date:  2021 Date:  6-3-21   Activity/Exercise Parameters Parameters Parameters   Lumbar Rotation S/L x 30 reps S/L x 30 reps S/L x 30 reps   Lumbar Rotation  Supine x 30 reps    Swiss Ball Rotation with UE 5 lb Med Ball 3 x 10 Supine x 30 reps Supine x 30 reps   SKTC 6 x 30 sec Each Leg 6 x 30 sec Each Leg    Piriformis  6 x 30 sec Each Leg 6 x 30 sec   Hamstring stretch      Calf Stretch  6 x 30 sec 6 x 30 sec   Clams 3 x 10 3 x 12    Bridges 3 x 10  Swiss Ball 3 x 12 3 x 10    Anti Rotation Shoulder Extension 3 x 10 Red Shoulder Extension 3 x 10 Red Shoulder Extension 3 x 10 green    Anti Rotation Side Step 3 x 10 Red Side Step 3 x 10 Red Side Step 3 x 10 green    Nu Step 10 minutes Working on ROM 8 minutes Working on ROM 10 minutes level 4   TRX  2 x 10 Flexion/Extension 2 x 10 flex/ext   Monster Walk  Side 3 x 50 feet Red             Time spent with patient reviewing proper muscle recruitment and technique with exercises. MANUAL THERAPY: (0 minutes): Joint mobilization, Soft tissue mobilization and Manipulation was utilized and necessary because of the patient's restricted joint motion, painful spasm, loss of articular motion and restricted motion of soft tissue   Not Today    MODALITIES: (0 minutes):      None Today     HEP: As above; handouts given to patient for all exercises. Treatment/Session Summary:    · Response to Treatment:  Patient reported no pain with treatment. He continues to have core and hip strength deficits, right greater then left. Patient required verbal cues for posture, form, and mechanics. His HEP was reviewed and progressed. He would benefit from continued progression of strength and stability to return to prior level of function. · Communication/Consultation:  Home Exercise Program  · Equipment provided today:  None today  · Recommendations/Intent for next treatment session: Next visit will focus on mobility, flexibility, range of motion, strength, and functional ability.     Total Treatment Billable Duration:  55 minutes:   PT Patient Time In/Time Out  Time In: 3537  Time Out: [address], PT

## 2021-06-17 ENCOUNTER — HOSPITAL ENCOUNTER (OUTPATIENT)
Dept: PHYSICAL THERAPY | Age: 56
Discharge: HOME | End: 2021-06-17
Payer: MEDICARE

## 2021-06-17 NOTE — PROGRESS NOTES
Physical Therapy  [address] at [facility] 6/17/2021    Patient doing well and wants to be discharged.       Keshia Vinson PT, DPT, TPS

## 2021-06-17 NOTE — THERAPY DISCHARGE
Mitch Hamilton. : 1965 Primary: Bsi [employer] Medicare Hmo Secondary:  Therapy Center at [facility] [address] [employer] [place], [address] [place] [address], [place], [address] [place] [address] [phone]   [phone] OUTPATIENT PHYSICAL THERAPY:Discontinuation Summary 2021 ICD-10: Treatment Diagnosis: Low Back Pain [M54.5] Treatment Diagnosis 2: Right Hip Pain [M25.551] Treatment Diagnosis 3: Generalized Muscle Weakness [M62.81] Precautions: Hernia Repair Allergies: Parafon forte dsc [chlorzoxazone] TREATMENT PLAN: 
Effective Dates: 2021 TO 2021 (60 days). Frequency/Duration: 2 times a week for 60 Day(s) MEDICAL/REFERRING DIAGNOSIS: 
Low back pain [M54.5] Trochanteric bursitis, right hip [M70.61] DATE OF ONSET: Chronic Low Back and Right Hip Pain (started approximately 6-7 years ago) REFERRING PHYSICIAN: Sanaz Marrero MD MD Orders: Evaluate and Treat Return MD Appointment: Per patient not currently scheduled. INITIAL ASSESSMENT:  Mr. Sammie Oliva is a 60 y.o. male presenting to physical therapy with complaints of right low back pain, right hip pain pain radiates down to posterior thigh, also around to front of hip/thigh to inside of his right knee. He reports having pain for the last approximately 6-7 years ago, denies any mechanism of injury. He reports in the last 8 months he has experienced increased pain and also numbness right posterior/lateral thigh. He reports that he has trouble driving, right thigh goes numb. He reports trouble sitting for any length of time. He reports trouble sleeping wakes up at least 3-4 times per night. He reports unable to lie on his right side. Patient presents with increased pain, decreased strength, decreased ROM, decreased flexibility, impaired gait, impaired posture, impaired transfer ability, decreased activity tolerance, and overall impaired functional mobility.  Patient is a good candidate for skilled physical therapy interventions to include manual therapy, therapeutic exercise, balance training, gait training, transfer training, postural re-education, body mechanics training, and pain modalities and trigger point dry needling as needed. DISCONTINUATION 6/17/2021:  Jassi Woodruff was seen in physical therapy from 4/21/2021 to 6/17/2021 for 7 visits. Treatment has been discontinued at this time due to patient feeling better and wanting to continue on his own. The below goals were met prior to discontinuation. Some goals were not met due to patient self discharging. Thank you for this referral.  
  
PROBLEM LIST (Impacting functional limitations): 1. Decreased Strength 2. Decreased ADL/Functional Activities 3. Decreased Transfer Abilities 4. Decreased Ambulation Ability/Technique 5. Increased Pain 6. Decreased Activity Tolerance 7. Decreased Pacing Skills 8. Decreased Flexibility/Joint Mobility 9. Decreased [place] with Home Exercise Program INTERVENTIONS PLANNED: (Treatment may consist of any combination of the following) 1. Bed Mobility 2. Cryotherapy 3. Electrical Stimulation 4. Gait Training 5. Heat 6. Home Exercise Program (HEP) 7. Manual Therapy 8. Neuromuscular Re-education/Strengthening 9. Range of Motion (ROM) 10. Therapeutic Activites 11. Therapeutic Exercise/Strengthening 12. Transcutaneous Electrical Nerve Stimulation (TENS) 13. Ultrasound (US) 14. Trigger Point Dry Needling (TPDN) GOALS: (Goals have been discussed and agreed upon with patient.) Short-Term Functional Goals: Time Frame: 4/21/2021 to 5/20/2021 1. Patient demonstrates independence with home exercise program without verbal cueing provided by therapist. -NOT MET 2. Patient will report no more than 2/10 low back pain at rest in order to demonstrate improved self pain control and tolerance and improved quality of sleep. -MET 3.  Patient will be educated in and demonstrate improved upright posture including to assist with improve tolerance with sitting and driving. - MET 4. Patient will be educated in and demonstrate proper squat lift technique in order to reduce stress on low back and hips, improve safety, and reduce risk of injury. - MET 5. Patient will improve lumbar flexion to 40 degrees to assist with bending, squatting, lifting, dressing, bathing with decreased pain and improved ability. -NOT MET Discharge Goals: Time Frame: 4/21/2021 to 6/20/2021 1. Patient will improve lumbar flexion to 50 degrees to assist with decreased pain and improved function when lifting and carrying. - NOT MET 2. Patient will improve strength to 4+/5 to assist with ability to achieve and maintain neutral posture sitting posture to improve ability to sit and drive for prolonged periods. -NOT MET 3. Patient jhonny improve lumbar extension to 30 degrees to assist with ability to achieve and maintain more upright neutral posture to improve sitting and driving. -NOT MET 4. Patient will improve Modified Oswestry Scale score to 10/50 from 21/50. -NOT MET Outcome Measure: Tool Used: Modified Oswestry Low Back Pain Questionnaire Score:  Initial: 21/50  Most Recent: X/50 (Date: -- ) Interpretation of Score: Each section is scored on a 0-5 scale, 5 representing the greatest disability. The scores of each section are added together for a total score of 50. UPDATED OBJECTIVE MEASURES: Unable to determine due to patient self discharged due to patient reporting doing well, no longer wants to continue with therapy at this time. Reason for Services/Other Comments: 
· Patient to be discharged at this time secondary to patient request. 
 
Rehabilitation Potential For Stated Goals: Good Regarding Berna Hart Jr.'s therapy, I certify that the treatment plan above will be carried out by a therapist or under their direction. Thank you for this referral, 
Zaina Hamilton, PT Referring Physician Signature: Lesly Verduzco MD No Signature is Required for this note.

## 2021-11-30 ENCOUNTER — HOSPITAL ENCOUNTER (OUTPATIENT)
Dept: LAB | Age: 56
Discharge: HOME | End: 2021-11-30

## 2021-11-30 PROCEDURE — 88305 TISSUE EXAM BY PATHOLOGIST: CPT

## 2022-03-18 PROBLEM — M54.12 CERVICAL RADICULAR PAIN: Status: ACTIVE | Noted: 2020-12-31

## 2022-03-19 PROBLEM — R35.1 BENIGN PROSTATIC HYPERPLASIA WITH NOCTURIA: Status: ACTIVE | Noted: 2020-12-31

## 2022-03-19 PROBLEM — G47.31 COMPLEX SLEEP APNEA SYNDROME: Status: ACTIVE | Noted: 2020-02-17

## 2022-03-19 PROBLEM — K21.00 GASTROESOPHAGEAL REFLUX DISEASE WITH ESOPHAGITIS WITHOUT HEMORRHAGE: Status: ACTIVE | Noted: 2020-12-31

## 2022-03-19 PROBLEM — N40.1 BENIGN PROSTATIC HYPERPLASIA WITH NOCTURIA: Status: ACTIVE | Noted: 2020-12-31

## 2022-03-19 PROBLEM — G47.39 COMPLEX SLEEP APNEA SYNDROME: Status: ACTIVE | Noted: 2020-02-17

## 2022-06-21 DIAGNOSIS — R35.0 FREQUENCY OF MICTURITION: ICD-10-CM

## 2022-06-22 RX ORDER — TAMSULOSIN HYDROCHLORIDE 0.4 MG/1
CAPSULE ORAL
Qty: 90 CAPSULE | Refills: 1 | Status: SHIPPED | OUTPATIENT
Start: 2022-06-22

## 2022-07-07 RX ORDER — LANSOPRAZOLE 30 MG/1
CAPSULE, DELAYED RELEASE ORAL
Qty: 90 CAPSULE | Refills: 1 | Status: SHIPPED | OUTPATIENT
Start: 2022-07-07

## 2022-11-16 ENCOUNTER — TELEPHONE (OUTPATIENT)
Dept: PRIMARY CARE CLINIC | Facility: CLINIC | Age: 57
End: 2022-11-16

## 2022-11-16 NOTE — TELEPHONE ENCOUNTER
----- Message from Yo Prado sent at 11/16/2022  2:36 PM EST -----  Subject: Referral Request    Reason for referral request? Sleep Study   Provider patient wants to be referred to(if known):     Provider Phone Number(if known): Additional Information for Provider? Patient is needing a sleep study   because he has sleep apnea. He did a study before covid but did not follow   up.  Does he need to do another sleep study?   ---------------------------------------------------------------------------  --------------  [address]    [phone]; OK to leave message on voicemail  ---------------------------------------------------------------------------  --------------

## 2022-11-16 NOTE — TELEPHONE ENCOUNTER
----- Message from Codi Gustafson sent at 11/16/2022  2:35 PM EST -----  Subject: Refill Request    QUESTIONS  Name of Medication? carisoprodol (SOMA) 350 MG tablet  Patient-reported dosage and instructions? 350mg as needed   How many days do you have left? 0  Preferred Pharmacy? [id number]  Pharmacy phone number (if available)? [phone]  ---------------------------------------------------------------------------  --------------  Doddridge Pilon INFO  What is the best way for the office to contact you? OK to leave message on   voicemail  Preferred Call Back Phone Number? [phone]  ---------------------------------------------------------------------------  --------------  SCRIPT ANSWERS  Relationship to Patient?  Self

## 2022-11-17 DIAGNOSIS — M54.12 RADICULOPATHY, CERVICAL REGION: Primary | ICD-10-CM

## 2022-11-17 RX ORDER — CARISOPRODOL 350 MG/1
350 TABLET ORAL DAILY PRN
Qty: 30 TABLET | Refills: 0 | Status: SHIPPED | OUTPATIENT
Start: 2022-11-17 | End: 2022-12-21 | Stop reason: SDUPTHER

## 2022-11-17 NOTE — TELEPHONE ENCOUNTER
----- Message from Katrin Joel sent at 11/16/2022  2:36 PM EST -----  Subject: Referral Request    Reason for referral request? Sleep Study   Provider patient wants to be referred to(if known):     Provider Phone Number(if known): Additional Information for Provider? Patient is needing a sleep study   because he has sleep apnea. He did a study before covid but did not follow   up.  Does he need to do another sleep study?   ---------------------------------------------------------------------------  --------------  [address]    [phone]; OK to leave message on voicemail  ---------------------------------------------------------------------------  --------------

## 2022-11-21 ENCOUNTER — TELEPHONE (OUTPATIENT)
Dept: PRIMARY CARE CLINIC | Facility: CLINIC | Age: 57
End: 2022-11-21

## 2022-11-21 NOTE — TELEPHONE ENCOUNTER
----- Message from Beola Snellen sent at 11/16/2022  2:32 PM EST -----  Subject: Appointment Request    Reason for Call: Established Patient Appointment needed: Routine Existing   Condition Follow Up    QUESTIONS    Reason for appointment request? No appointments available during search     Additional Information for Provider? Patient is needing a medication   refill appointment. His medication will run out before the next available   appointment.  Please call to schedule   ---------------------------------------------------------------------------  --------------  Salbador HI  [phone]; OK to leave message on voicemail  ---------------------------------------------------------------------------  --------------  SCRIPT ANSWERS  COVID Screen: Ishaan Cardona

## 2022-12-21 ENCOUNTER — OFFICE VISIT (OUTPATIENT)
Dept: PRIMARY CARE CLINIC | Facility: CLINIC | Age: 57
End: 2022-12-21
Payer: MEDICARE

## 2022-12-21 VITALS
OXYGEN SATURATION: 97 % | TEMPERATURE: 98.2 F | HEART RATE: 90 BPM | BODY MASS INDEX: 24.9 KG/M2 | WEIGHT: 159 LBS | DIASTOLIC BLOOD PRESSURE: 74 MMHG | SYSTOLIC BLOOD PRESSURE: 124 MMHG

## 2022-12-21 DIAGNOSIS — N40.1 BENIGN PROSTATIC HYPERPLASIA WITH NOCTURIA: ICD-10-CM

## 2022-12-21 DIAGNOSIS — G47.33 OBSTRUCTIVE SLEEP APNEA (ADULT) (PEDIATRIC): Primary | ICD-10-CM

## 2022-12-21 DIAGNOSIS — Z23 FLU VACCINE NEED: ICD-10-CM

## 2022-12-21 DIAGNOSIS — R35.1 BENIGN PROSTATIC HYPERPLASIA WITH NOCTURIA: ICD-10-CM

## 2022-12-21 DIAGNOSIS — F41.1 GENERALIZED ANXIETY DISORDER: ICD-10-CM

## 2022-12-21 DIAGNOSIS — R35.0 FREQUENCY OF MICTURITION: ICD-10-CM

## 2022-12-21 DIAGNOSIS — K21.00 GASTROESOPHAGEAL REFLUX DISEASE WITH ESOPHAGITIS WITHOUT HEMORRHAGE: ICD-10-CM

## 2022-12-21 DIAGNOSIS — M54.12 RADICULOPATHY, CERVICAL REGION: ICD-10-CM

## 2022-12-21 PROCEDURE — G0008 ADMIN INFLUENZA VIRUS VAC: HCPCS | Performed by: FAMILY MEDICINE

## 2022-12-21 PROCEDURE — 3017F COLORECTAL CA SCREEN DOC REV: CPT | Performed by: FAMILY MEDICINE

## 2022-12-21 PROCEDURE — G8420 CALC BMI NORM PARAMETERS: HCPCS | Performed by: FAMILY MEDICINE

## 2022-12-21 PROCEDURE — G8482 FLU IMMUNIZE ORDER/ADMIN: HCPCS | Performed by: FAMILY MEDICINE

## 2022-12-21 PROCEDURE — 90674 CCIIV4 VAC NO PRSV 0.5 ML IM: CPT | Performed by: FAMILY MEDICINE

## 2022-12-21 PROCEDURE — G8427 DOCREV CUR MEDS BY ELIG CLIN: HCPCS | Performed by: FAMILY MEDICINE

## 2022-12-21 PROCEDURE — 99214 OFFICE O/P EST MOD 30 MIN: CPT | Performed by: FAMILY MEDICINE

## 2022-12-21 PROCEDURE — 1036F TOBACCO NON-USER: CPT | Performed by: FAMILY MEDICINE

## 2022-12-21 RX ORDER — TAMSULOSIN HYDROCHLORIDE 0.4 MG/1
CAPSULE ORAL
Qty: 90 CAPSULE | Refills: 1 | OUTPATIENT
Start: 2022-12-21

## 2022-12-21 RX ORDER — LORAZEPAM 0.5 MG/1
0.5 TABLET ORAL EVERY 8 HOURS PRN
Qty: 30 TABLET | Refills: 2 | Status: SHIPPED | OUTPATIENT
Start: 2022-12-21 | End: 2023-06-19

## 2022-12-21 RX ORDER — TAMSULOSIN HYDROCHLORIDE 0.4 MG/1
CAPSULE ORAL
Qty: 90 CAPSULE | Refills: 3 | Status: SHIPPED | OUTPATIENT
Start: 2022-12-21

## 2022-12-21 RX ORDER — CARISOPRODOL 350 MG/1
350 TABLET ORAL DAILY PRN
Qty: 30 TABLET | Refills: 5 | Status: SHIPPED | OUTPATIENT
Start: 2022-12-21 | End: 2023-03-21

## 2022-12-21 ASSESSMENT — PATIENT HEALTH QUESTIONNAIRE - PHQ9
SUM OF ALL RESPONSES TO PHQ9 QUESTIONS 1 & 2: 0
SUM OF ALL RESPONSES TO PHQ QUESTIONS 1-9: 0
1. LITTLE INTEREST OR PLEASURE IN DOING THINGS: 0
SUM OF ALL RESPONSES TO PHQ QUESTIONS 1-9: 0
2. FEELING DOWN, DEPRESSED OR HOPELESS: 0

## 2022-12-21 NOTE — PROGRESS NOTES
[facility]  Tonie Bar M.D.  [address]  [place], [place] [address]  Ph No:  [phone]  Fax:  [phone]    CHIEF COMPLAINT:  Chief Complaint   Patient presents with    Referral - General     Patient has sleep apnea. Was diagnosed with sleep apnea before covid pandemic. He never followed up. Requesting referral for sleep study    Medication Refill          IMPRESSION/PLAN    1. Obstructive sleep apnea (adult) (pediatric)  -     [address] [place] [address] [facility]  2. Generalized anxiety disorder  -     LORazepam (ATIVAN) 0.5 MG tablet; Take 1 tablet by mouth every 8 hours as needed for Anxiety for up to 180 days. Max Daily Amount: 1.5 mg, Disp-30 tablet, R-2Normal  3. Benign prostatic hyperplasia with nocturia  Assessment & Plan:   Well-controlled, continue current medications, medication adherence emphasized and lifestyle modifications recommended  Orders:  -     tamsulosin (FLOMAX) 0.4 MG capsule; TAKE 1 CAPSULE BY MOUTH EVERY DAY, Disp-90 capsule, R-3Normal  4. Gastroesophageal reflux disease with esophagitis without hemorrhage  Assessment & Plan:   Well-controlled, continue current medications, medication adherence emphasized and lifestyle modifications recommended   Advised to try reducing his dose of medication to every other day rather than daily. Can return daily if symptoms return. We discussed the risk and benefits of daily use of this medication. 5. Radiculopathy, cervical region  -     carisoprodol (SOMA) 350 MG tablet; Take 1 tablet by mouth daily as needed for Muscle spasms for up to 90 days. , Disp-30 tablet, R-5Normal  6. Flu vaccine need  -     Influenza, FLUCELVAX, (age 10 mo+), IM, Preservative Free, 0.5 mL        Will send for CPAP titration. He did start procedure in 2019 but was lost to follow-up during the pandemic. He was told that he did have sleep apnea but never started CPAP.   We did refill his prescription for Flomax and refill prescription for lorazepam.  He is aware that lorazepam can be habit-forming and sedating and generally takes this only as needed. Continue Flomax daily for symptomatic BPH. HISTORY OF PRESENT ILLNESS:  Patient here today for follow-up. He states that he was diagnosed with sleep apnea in 2019. He had sleep study but did not return for the CPAP titration due to the pandemic. He complains of daytime fatigue and witnessed apnea. He also has history of anxiety disorder. He takes lorazepam but only as needed. He is aware of the fact that it is sedating and habit-forming. He also has history of GERD. He has tried to come off of Prevacid but his symptoms do return. Has been on medication for several years. He also has history of BPH. He states that when he runs out of medications his symptoms become very noticeable. He otherwise has no other complaints. REVIEW OF SYSTEMS:  Review of Systems    Review of systems is as stated above, otherwise is negative. PHYSICAL EXAM:  Vital Signs - /74 (Site: Right Upper Arm)   Pulse 90   Temp 98.2 °F (36.8 °C) (Temporal)   Wt 159 lb (72.1 kg)   SpO2 97%   BMI 24.90 kg/m²      Physical Exam  Constitutional:       Appearance: Normal appearance. Cardiovascular:      Rate and Rhythm: Normal rate and regular rhythm. Pulses: Normal pulses. Heart sounds: Normal heart sounds. Pulmonary:      Effort: Pulmonary effort is normal.      Breath sounds: Normal breath sounds. Neurological:      Mental Status: He is alert and oriented to person, place, and time. Psychiatric:         Behavior: Behavior normal.            Tonie Díaz MD            Dictated using voice recognition software.  Proofread, but unrecognized voice recognition errors may exist.

## 2022-12-22 NOTE — ASSESSMENT & PLAN NOTE
Well-controlled, continue current medications, medication adherence emphasized and lifestyle modifications recommended   Advised to try reducing his dose of medication to every other day rather than daily. Can return daily if symptoms return. We discussed the risk and benefits of daily use of this medication.

## 2023-01-11 NOTE — PROGRESS NOTES
[facility]  [address], [address] [place] [address] [address], [address] [facility]  [phone]    Patient Name:  Argentina Ha. YOB: 1965      Office Visit 1/13/2023    CHIEF COMPLAINT:    Chief Complaint   Patient presents with    Sleep Apnea       HISTORY OF PRESENT ILLNESS:        The patient present in outpatient consultation at the request of Dr. Daljit Pearson for management of obstructive sleep apnea. The patient underwent a diagnostic polysomnography in 2020 because of symptoms including snoring, witnessed apneas, morning headaches, daytime sleepiness, and waking up with a dry mouth. Additional symptoms include trouble staying asleep, trouble remembering and concentrating, excessive sweating at nighttime and acid reflux. There is no history of cataplexy, hypnagogic hallucinations, or sleep paralysis. In addition, there is no history of frequent leg movements, kicking at night, or an inability to keep the legs still. The diagnostic polysomnography was notable for a respiratory disturbance index of 42.4/hour. Oxygen desaturations are low as 76% were noted. Significant cardiac arrhythmias were not evident. The patient was noted to have no limb movements. A subsequent CPAP titration study was conducted. PAP levels as high as 15/10 cm with backup rate of 12/min were performed. PAP was  effective in eliminating disordered breathing. The patient was recommended to start his treatment at that time but he indicated he was concerned abou starting his treatment during the beginning of COVID pandemic. He was lost for follow-up at that time and his symptoms are much worse now and he is agreed to proceed with the treatment plan. The pathophysiology of sleep apnea and different treatment option including positive pressure ventilation treatment using BiPAP or BiPAP ST, inspire device were discussed with him in detail.   Furthermore, I indicated to him that for titration with BiPAP ST is warranted at this time to determine the optimal pressure and make sure he does not need any other modality. If he had difficulty tolerating that then we will consider ENT evaluation for inspire device. 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 He has not been seen by ENT anymore. The Glyndon score today was 17 out of 24. Sleepiness Scale:         Past Medical History:   Diagnosis Date    Chronic pain     neck    Difficult intubation     pt states had temp trach prior to  vocal cord mass in 2003.   in 2012 surgery with no problems     Diverticulosis of colon 2016    Dyspnea 4/3/2013    GERD (gastroesophageal reflux disease)     controlled by prevacid    Hypercholesterolemia     pt denies - unable to remove     Insomnia     controlled by ambien    Muscle spasm     neck and shoulders    Psychiatric disorder     anxiety controlled by medication    Unspecified sleep apnea     does not use c-pap    Urinary frequency        Patient Active Problem List   Diagnosis    Chronic pain in shoulder    Cervical radicular pain    Complex sleep apnea syndrome    Benign prostatic hyperplasia with nocturia    Allergic rhinitis due to pollen    Gastroesophageal reflux disease with esophagitis without hemorrhage    Hyperlipidemia    Hypersomnia    Nocturnal hypoxemia    Insomnia with sleep apnea       Past Surgical History:   Procedure Laterality Date    CARPAL TUNNEL RELEASE Bilateral     2 separate surgeries    CARPAL TUNNEL RELEASE Right     COLONOSCOPY  11/30/2021    No Polyps     COLONOSCOPY  09/2017    Polyps    COLONOSCOPY N/A 9/21/2016    [facility]--no polyps, random biopsies negative--10 year recall    HEENT  last 1/2004    vocal cord poyp removed multiple    HERNIA REPAIR Right TRACHEOSTOMY         [unfilled]    Social History     Socioeconomic History    Marital status:      Spouse name: Not on file    Number of children: Not on file    Years of education: Not on file    Highest education level: Not on file   Occupational History    Not on file   Tobacco Use    Smoking status: Former     Packs/day: 0.50     Types: Cigarettes     Quit date: 2003     Years since quittin.3    Smokeless tobacco: Never   Substance and Sexual Activity    Alcohol use: Yes     Alcohol/week: 12.0 standard drinks    Drug use: No     Types: Marijuana Aloma Oscar)    Sexual activity: Not on file   Other Topics Concern    Not on file   Social History Narrative    Not on file     Social Determinants of Health     Financial Resource Strain: Not on file   Food Insecurity: Not on file   Transportation Needs: Not on file   Physical Activity: Not on file   Stress: Not on file   Social Connections: Not on file   Intimate Partner Violence: Not on file   Housing Stability: Not on file         Family History   Problem Relation Age of Onset    Diabetes Father         po meds    Hypertension Father     Psychiatric Disorder Mother     No Known Problems Sister     Cancer Paternal Grandfather         prostate    Heart Disease Mother         no MI- 1 stent         Allergies   Allergen Reactions    Chlorzoxazone Itching     nausea         Current Outpatient Medications   Medication Sig    tamsulosin (FLOMAX) 0.4 MG capsule TAKE 1 CAPSULE BY MOUTH EVERY DAY    carisoprodol (SOMA) 350 MG tablet Take 1 tablet by mouth daily as needed for Muscle spasms for up to 90 days. LORazepam (ATIVAN) 0.5 MG tablet Take 1 tablet by mouth every 8 hours as needed for Anxiety for up to 180 days. Max Daily Amount: 1.5 mg    lansoprazole (PREVACID) 30 MG delayed release capsule TAKE 1 CAPSULE EVERY DAY BEFORE BREAKFAST     No current facility-administered medications for this visit.            REVIEW OF SYSTEMS:   CONSTITUTIONAL:   There is no history of fever, chills, night sweats, weight loss, weight gain, persistent fatigue, or lethargy/hypersomnolence. EYES:   Denies problems with eye pain, erythema, blurred vision, or visual field loss. ENTM:   Denies history of tinnitus, epistaxis, sore throat, hoarseness, or dysphonia. LYMPH:   Denies swollen glands. CARDIAC:   No chest pain, pressure, discomfort, palpitations, orthopnea, murmurs, or edema. GI:   No dysphagia, heartburn reflux, nausea/vomiting, diarrhea, abdominal pain, or bleeding. :   Denies history of dysuria, hematuria, polyuria, or decreased urine output. MS:   No history of myalgias, arthralgias, bone pain, or muscle cramps. SKIN:   No history of rashes, jaundice, cyanosis, nodules, or ulcers. ENDO:   Negative for heat or cold intolerance. No history of DM. PSYCH:   Negative for anxiety, depression, insomnia, hallucinations. NEURO:   There is no history of AMS, persistent headache, decreased level of consciousness, seizures, or motor or sensory deficits. PHYSICAL EXAM:    Vitals:    01/13/23 1549   BP: 120/70   Pulse: 79   Resp: 14   Temp: 97.4 °F (36.3 °C)   SpO2: 97%        GENERAL APPEARANCE:   The patient is normal weight and in no respiratory distress. HEENT:   PERRL. Conjunctivae unremarkable. Nasal mucosa is without epistaxis, exudate, or polyps. Gums and dentition are unremarkable. There is moderately severe oropharyngeal narrowing. He is Mallampati 3. NECK/LYMPHATIC:   Symmetrical with no elevation of jugular venous pulsation. Trachea midline. No thyroid enlargement. No cervical adenopathy. Old scar from tracheostomy is noted   LUNGS:   Normal respiratory effort with symmetrical lung expansion. Breath sounds are clear bilaterally. HEART:   There is a regular rate and rhythm. No murmur, rub, or gallop. There is no edema in the lower extremities. ABDOMEN:   Soft and non-tender. No hepatosplenomegaly.   Bowel sounds are normal. SKIN:   There are no rashes, cyanosis, jaundice, or ecchymosis present. EXTREMITIES:   The extremities are unremarkable without clubbing, cyanosis, joint inflammation, degenerative, or ischemic change. MUSCULOSKELETAL:   There is no abnormal tone, muscle atrophy, or abnormal movement present. NEURO:   The patient is alert and oriented to person, place, and time. Memory appears intact and mood is normal.  No gross sensorimotor deficits are present. DIAGNOSTIC TESTS:  Split Study 2/11/20          ASSESSMENT:  (Medical Decision Making)         ICD-10-CM    1. Complex sleep apnea syndrome , this was severe and required BiPAP ST for adequate treatment. He will need full titration to determine the optimal pressure for his BiPAP ST and this will be ordered    The pathophysiology of obstructive sleep apnea was reviewed with the patient. It's potential to promote severe neurologic, cardiac, pulmonary, and gastrointestinal problems was discussed. Specifically, the increased incidence of hypertension, coronary artery disease, congestive heart failure, pulmonary hypertension, gastroesophageal reflux, pathologic hypersomnolence, memory loss, and glucose intolerance was related to the consequences of hypoxemia, hypercapnia, airway obstruction, and sympathetic overdrive. We also discussed the ability of nasal CPAP to correct these abnormalities through maintenance of a patent airway. Therapeutic options including surgery, oral appliances, and weight loss were also reviewed. G47.31 Ambulatory Referral to Sleep Studies      2. Hypersomnia , related to untreated sleep apnea G47.10 Ambulatory Referral to Sleep Studies      3. Nocturnal hypoxemia , related to sleep apnea should improve with the PAP treatment G47.34       4.  Insomnia with sleep apnea , likely worse with untreated sleep apnea G47.00     G47.30              PLAN:    The patient will be scheduled for urgent titration study for BiPAP ST optimal pressure. Once this was done he will be started on the appropriate setting    He is recommended to follow proper sleep hygiene and positional therapy    He will maintain his follow-up with other providers    I discussed with him other treatment option including inspire device. 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 Over 50% of today's office visit was spent in face to face time reviewing test results, prognosis, importance of compliance, education about disease process, benefits of medications, instructions for management of acute flare-ups, and follow up plans. Total face to face time spent with patient was 45 minutes.     Henry Grey MD  Electronically signed

## 2023-01-13 ENCOUNTER — OFFICE VISIT (OUTPATIENT)
Dept: SLEEP MEDICINE | Age: 58
End: 2023-01-13
Payer: MEDICARE

## 2023-01-13 VITALS
OXYGEN SATURATION: 97 % | RESPIRATION RATE: 14 BRPM | BODY MASS INDEX: 25.43 KG/M2 | HEART RATE: 79 BPM | SYSTOLIC BLOOD PRESSURE: 120 MMHG | HEIGHT: 67 IN | TEMPERATURE: 97.4 F | DIASTOLIC BLOOD PRESSURE: 70 MMHG | WEIGHT: 162 LBS

## 2023-01-13 DIAGNOSIS — G47.34 NOCTURNAL HYPOXEMIA: ICD-10-CM

## 2023-01-13 DIAGNOSIS — G47.31 COMPLEX SLEEP APNEA SYNDROME: Primary | ICD-10-CM

## 2023-01-13 DIAGNOSIS — G47.00 INSOMNIA WITH SLEEP APNEA: ICD-10-CM

## 2023-01-13 DIAGNOSIS — G47.10 HYPERSOMNIA: ICD-10-CM

## 2023-01-13 DIAGNOSIS — G47.30 INSOMNIA WITH SLEEP APNEA: ICD-10-CM

## 2023-01-13 PROCEDURE — 3017F COLORECTAL CA SCREEN DOC REV: CPT | Performed by: INTERNAL MEDICINE

## 2023-01-13 PROCEDURE — G8419 CALC BMI OUT NRM PARAM NOF/U: HCPCS | Performed by: INTERNAL MEDICINE

## 2023-01-13 PROCEDURE — 1036F TOBACCO NON-USER: CPT | Performed by: INTERNAL MEDICINE

## 2023-01-13 PROCEDURE — G8482 FLU IMMUNIZE ORDER/ADMIN: HCPCS | Performed by: INTERNAL MEDICINE

## 2023-01-13 PROCEDURE — G8427 DOCREV CUR MEDS BY ELIG CLIN: HCPCS | Performed by: INTERNAL MEDICINE

## 2023-01-13 PROCEDURE — 99215 OFFICE O/P EST HI 40 MIN: CPT | Performed by: INTERNAL MEDICINE

## 2023-01-13 ASSESSMENT — SLEEP AND FATIGUE QUESTIONNAIRES
HOW LIKELY ARE YOU TO NOD OFF OR FALL ASLEEP WHILE SITTING AND TALKING TO SOMEONE: 1
HOW LIKELY ARE YOU TO NOD OFF OR FALL ASLEEP WHILE SITTING QUIETLY AFTER LUNCH WITHOUT ALCOHOL: 3
HOW LIKELY ARE YOU TO NOD OFF OR FALL ASLEEP WHILE SITTING INACTIVE IN A PUBLIC PLACE: 3
HOW LIKELY ARE YOU TO NOD OFF OR FALL ASLEEP WHILE WATCHING TV: 3
HOW LIKELY ARE YOU TO NOD OFF OR FALL ASLEEP WHILE SITTING AND READING: 3
HOW LIKELY ARE YOU TO NOD OFF OR FALL ASLEEP WHEN YOU ARE A PASSENGER IN A CAR FOR AN HOUR WITHOUT A BREAK: 2
HOW LIKELY ARE YOU TO NOD OFF OR FALL ASLEEP IN A CAR, WHILE STOPPED FOR A FEW MINUTES IN TRAFFIC: 1
HOW LIKELY ARE YOU TO NOD OFF OR FALL ASLEEP WHILE LYING DOWN TO REST IN THE AFTERNOON WHEN CIRCUMSTANCES PERMIT: 1
ESS TOTAL SCORE: 17

## 2023-01-13 NOTE — PATIENT INSTRUCTIONS
Sleep Hygiene Instructions    Sleep only as much as you need to feel refreshed during the following day. Restricting your time in bed helps to consolidate and deepen your sleep. Excessively long times in bed lead to fragmented and shallow sleep. Get up at your regular time the next day, no matter how little your slept. Get up at the same time each day, 7 days a week. A regular wake time in the morning leads to regular times on sleep onset, and helps to set your biological clock. Exercise regularly. Schedule exercise times so that they do not occur within 3 hours of when you intend to go to bed. Exercise makes it easier to initiate sleep and deepen sleep. Don't take your problems to bed. Plan some time earlier in the evening for working on your problems or planning the next day's activities. Worrying may interfere with initiating sleep and produce shallow sleep. Train yourself to use the bedroom only for sleep and sexual activity. This will help condition your brain to see bed as the place for sleeping. Do not read, watch TV or eat in bed. Do not try and fall asleep. This only makes the problem worse. Instead, turn on the light, leave the bedroom, and do something different like reading a book. Don't engage in stimulating activity. Return to bed only when you feel sleepy. Avoid long naps. Staying awake during the day helps to fall asleep at night. Naps totalling more than 30 minutes increase your chances of having trouble sleeping at night. Make sure that your bedroom is comfortable and free from light and noise. A comfortable, noise-free sleep environment will reduce the likelihood that you will wake up during the night. Noise that does not awaken you may disturb the quality of your sleep. Carpeting, insulated curtains, and closing the door may help. Make sure that your bedroom is at a comfortable temperature during the night.  Excessively warm or cold sleep environments may disturb sleep. Eat regular meals and di not go to bed hungry. Hunger may disturb sleep. A light snack at bedtime (especially carbohydrates) may help sleep, but avoid greasy or heavy foods. Avoid excessive liquids in the evening. Reducing liquid intake will minimize the need for night-time trips to the bathroom. Cut down on all caffeine products. Caffeinated beverages and food (Coffee, tea, cola, chocolate) can cause difficulty falling asleep, awakenings during the night, and shallow sleep. Even caffeine early in the day can disrupt night-time sleep. Avoid alcohol, especially in the evening. Although alcohol helps tense people fall asleep more easily, it causes awakenings later in the night. Smoking may disturb sleep. Nicotine is a stimulant. Try not to smoke during the night when you have trouble sleeping. Learning about Sleeping Well    What does sleeping well mean? Sleeping well means getting enough sleep. How much sleep is enough varies among people. The number of hours you sleep is not as improtant as how you feel when you wake up. If you do not feel refreshed, you probably need more sleep. Another sign of not getting enough sleep is feeling tired during the day. The average totally nightly sleep time is 7 1/2 to 8 hours. Healthy adults may need a little more or a little less than this. Why is getting enough sleep important? Getting enough quality sleep is a basic part of good health. When your sleep suffers, your mood and your thoughts can suffer too. Your thoughts can suffer too. You ma find yourself feeling more grumpy or stressed. No getting enough sleep also can lead to serious problems, including injury, accidents, anxiety, and depression. What might cause poor sleeping? Many things can cause sleep problems, including:    Stress. Stress can be caused by fear about a single event, such as giving a speech.   Or you may have ongoing stress, such as worry about work or school. Depression, anxiety, and other mental or emotional conditions. Changes in your sleep habits or surroundings. This includes changes that happen where you sleep, such as noise, light, or sleeping in a different bed. It also includes changes in your sleep pattern, such as having jet lab or working a late shift. Health problems, such as pain, breathing problems, and restless legs syndrome. Lack of regular exercise. How can you help yourself? Here are some tips that may help you sleep more soundly and wake up feeling more refreshed. Your sleeping area    Use your bedroom only for sleeping and sex. A bit of light reading may help you fall asleep. But if it doesn't, do your reading elsewhere in the house. Don't watch TV in bed. Be sure your bed is big enough to stretch out comfortable, especially if you have a sleep partner. Keep your bedroom quiet, dark, and cool. Use curtains, blinds, or sleep mask to block out light. To block out noise, use earplugs, soothing music, or a \"white noise\" machine. Your evening and bedtime routine    Create a relaxing bedtime routine. You might want to take a warm shower or bath, listen to soothing music, or drink a cup of non-caffeinated tea. Go to bed at the same time every night. And get up at the same time every morning, even if you feel tired. What to avoid:    Limit caffeine (coffee, tea, caffeinated sodas) during the day, and dont have any for at least 4 to 6 hours before bedtime. Don't drink alcohol before bedtime. Alcohol can cause you to wake up more often during the night. Don't smoke or use tobacco, especially in the evening. Nicotine can keep you awake. Don't like in bed away for too long. If you can't fall asleep, or if you wake up in the middle of the night and can't get back to sleep within 15 minutes or so, get out of bed and go to another room until you feel sleepy.     Don't take medicine right before bed that may keep you awake or make you feel hyper or enegerized. Your doctor can tell you if your medicine may do this and if you can take it earlier in the day. If you can't sleep:    Imagine yourself in a peaceful, pleasant scene. Focus on the details and feelings of being in a place that is relaxing. Get up and do a quiet or boring activity until you feel sleepy. Don't drink liquids after 6 p.m. if you wake up often because you have to go to the bathroom. Where can you learn more? Go to http://www.gray.com/    Enter G444 in the search box to learn more about \"Learning About Sleeping Well. \"           Snoring: After Your Visit    Your Care Instructions  Snoring is a noise that you may make while breathing during sleep. You snore when the flow of air from your mouth or nose to your lungs makes the tissues of your throat vibrate while you sleep. This usually is caused by a blockage or narrowing in your nose, mouth, or throat (airway). Snoring can be soft, loud, raspy, harsh, hoarse, or fluttering. Your bed partner may notice that you sleep with your mouth open and that you are restless while sleeping. If snoring interferes with your or your bed partner's sleep, either or both of you may feel tired during the day. You may be able to help reduce your snoring by making changes in your activities and in the way you sleep. Follow-up care is a key part of your treatment and safety. Be sure to make and go to all appointments, and call your doctor if you are having problems. It's also a good idea to know your test results and keep a list of the medicines you take. How can you care for yourself at home? Lose weight, if needed. Many people who snore are overweight. Weight loss can help reduce the narrowing of the airway and might reduce or stop snoring. Limit the use of alcohol and medicines.  Drinking a lot of alcohol or taking certain medicines, especially sleeping pills or tranquilizers, before sleep may make snoring worse. Go to bed at the same time each night, and get plenty of sleep. You may snore more when you have not had enough sleep. Sleep on your side. Sleeping on your side may stop snoring. Try sewing a pocket in the middle of the back of your pajama top, putting a tennis ball into the pocket, and stitching it closed. This will help keep you from sleeping on your back. Treat breathing problems. Breathing problems caused by colds or allergies can disturb airflow. This can lead to snoring. Use a device that helps keep your airway open during sleep. This could be a device that you put in your mouth. Other examples include strips or disks that you use on your nose. Do not smoke. Smoking can make snoring worse. If you need help quitting, talk to your doctor about stop-smoking programs and medicines. These can increase your chances of quitting for good. Raise the head of your bed 4 to 6 inches by putting bricks under the legs of the bed. This may prevent your tongue from falling toward the back of the throat, which can make a blocked or narrow airway worse. Putting pillows under your head will not help. When should you call for help? Watch closely for changes in your health, and be sure to contact your doctor if:  You snore, and you feel sleepy during the day. Your sleeping partner or you notice that you gasp, choke, or stop breathing during sleep. You do not get better as expected. Where can you learn more? Go to Hita.be  Enter J563 in the search box to learn more about \"Snoring: After Your Visit. \"   © 8868-2093 [employer]. Care instructions adapted under license by [facility] (which disclaims liability or warranty for this information).  This care instruction is for use with your licensed healthcare professional. If you have questions about a medical condition or this instruction, always ask your healthcare professional. [employer] Incorporated disclaims any warranty or liability for your use of this information. Content Version: 99.1.252957; Current as of: September 9, 2014            Sleep Studies: About This Test    What is it? Sleep studies are tests that watch what happens to your body during sleep. These studies usually are done in a sleep lab. Sleep labs are often located in hospitals. But sleep studies also can be done with portable equipment that you use at home. Why is this test done? Sleep studies are done to find sleep problems, including:  Sleep apnea or excessive snoring. Insomnia. Narcolepsy. Heart rhythm problems. Repeated muscle twitching of the feet, arms, or legs while you sleep. Shift work sleep disorder. How can you prepare for the test?  You may be asked to keep a sleep diary for 1 to 2 weeks before your sleep study. Don't take any naps for 2 to 3 days before your test.  You may be asked to avoid food or drinks with caffeine for a day or two before your test.  Take a shower or bath before your test, but don't use sprays, oils, or gels on your hair. Don't wear makeup, fingernail polish, or fake nails. Pack and take along a small overnight bag with personal items, such as a toothbrush, a comb, favorite pillows or blankets, and a book. You can wear your own nightclothes. Should I take my medications? Yes, ,unless specifically instructed by her physician taken medications as usual.  Also bring any medication. He would need during the night or early in the morning. The sleep Center, does not provide medication or snacks. It is important for the sleep gestational to know what medication she was taking this many medications can affect your sleep. What happens during the test?  In the sleep lab, you will be in a private room, much like a hotel room. There may be a waiting period. You may read, watch TV, or relax during this time.   If you have a commitment in the morning, be sure to inform the technician so they will be able to make sure you are out early enough in the morning. Otherwise, you can expect to be discharged between 6:00 - 6:30 in the morning. Small pads or patches called electrodes will be placed on your head and body with a small amount of glue and tape. These will record things like brain activity, eye movement, oxygen levels, and snoring. Soft elastic belts will be placed around your chest and belly to measure your breathing. Your blood oxygen levels will be checked by a small clip (oximeter) placed either on the tip of your index finger or on your earlobe. If you have sleep apnea, you may wear a mask that is connected to a continuous positive airway pressure (CPAP) machine. What else should you know about the test?  It may feel odd to be hooked to the sleep study equipment. The sleep lab technician understands that your sleep may not be the same as it is at home because of the equipment. Try to relax and make yourself as comfortable as possible. After your sleep problem has been identified, you may need a second study if your doctor orders treatment such as CPAP. Portable sleep study equipment is available for a person to do sleep studies at home. This may be a choice for people who have problems sleeping in a sleep lab. But home sleep studies may not give the same results as a sleep lab. How long does the test take? You will stay in the sleep lab overnight. Parking is available in the visitor's lot      Will I be able to get out of bed to use the restroom? Yes. All you have to do is wave your hands are call out  And the  Technologist will unhook the ponytail of wires from the box and you will be free to use the restroom. What happens after the test?  You will be able to go home right away. You can go back to your usual activities right away. Follow-up care is a key part of your treatment and safety.  Be sure to make and go to all appointments, and call your doctor if you are having problems. It's also a good idea to keep a list of the medicines you take. Ask your doctor when you can expect to have your test results. Where can you learn more? Go to [employer].be  Enter V995 in the search box to learn more about \"Sleep Studies: About This Test.\"   © 9157-1610 [employer]wise, Incorporated. Care instructions adapted under license by [facility] (which disclaims liability or warranty for this information). This care instruction is for use with your licensed healthcare professional. If you have questions about a medical condition or this instruction, always ask your healthcare professional. [address] any warranty or liability for your use of this information. Content Version: 16.8.670686; Current as of: September 9, 2014    Revisions 7/29/2015          Learning About Sleeping Well  What does sleeping well mean? Sleeping well means getting enough sleep. How much sleep is enough varies among people. The number of hours you sleep is not as important as how you feel when you wake up. If you do not feel refreshed, you probably need more sleep. Another sign of not getting enough sleep is feeling tired during the day. The average total nightly sleep time is 7½ to 8 hours. Healthy adults may need a little more or a little less than this. Why is getting enough sleep important? Getting enough quality sleep is a basic part of good health. When your sleep suffers, your mood and your thoughts can suffer too. You may find yourself feeling more grumpy or stressed. Not getting enough sleep also can lead to serious problems, including injury, accidents, anxiety, and depression. What might cause poor sleeping? Many things can cause sleep problems, including:  Stress. Stress can be caused by fear about a single event, such as giving a speech. Or you may have ongoing stress, such as worry about work or school.   Depression, anxiety, and other mental or emotional conditions. Changes in your sleep habits or surroundings. This includes changes that happen where you sleep, such as noise, light, or sleeping in a different bed. It also includes changes in your sleep pattern, such as having jet lag or working a late shift. Health problems, such as pain, breathing problems, and restless legs syndrome. Lack of regular exercise. How can you help yourself? Here are some tips that may help you sleep more soundly and wake up feeling more refreshed. Your sleeping area   Use your bedroom only for sleeping and sex. A bit of light reading may help you fall asleep. But if it doesn't, do your reading elsewhere in the house. Don't watch TV in bed. Be sure your bed is big enough to stretch out comfortably, especially if you have a sleep partner. Keep your bedroom quiet, dark, and cool. Use curtains, blinds, or a sleep mask to block out light. To block out noise, use earplugs, soothing music, or a \"white noise\" machine. Your evening and bedtime routine   Get regular exercise, but not within 3 to 4 hours before your bedtime. Create a relaxing bedtime routine. You might want to take a warm shower or bath, listen to soothing music, or drink a cup of noncaffeinated tea. Go to bed at the same time every night. And get up at the same time every morning, even if you feel tired. What to avoid   Limit caffeine (coffee, tea, caffeinated sodas) during the day, and don't have any for at least 4 to 6 hours before bedtime. Don't drink alcohol before bedtime. Alcohol can cause you to wake up more often during the night. Don't smoke or use tobacco, especially in the evening. Nicotine can keep you awake. Don't take naps during the day, especially close to bedtime. Don't lie in bed awake for too long.  If you can't fall asleep, or if you wake up in the middle of the night and can't get back to sleep within 15 minutes or so, get out of bed and go to another room until you feel sleepy. Don't take medicine right before bed that may keep you awake or make you feel hyper or energized. Your doctor can tell you if your medicine may do this and if you can take it earlier in the day. If you can't sleep   Imagine yourself in a peaceful, pleasant scene. Focus on the details and feelings of being in a place that is relaxing. Get up and do a quiet or boring activity until you feel sleepy. Don't drink any liquids after 6 p.m. if you wake up often because you have to go to the bathroom. Where can you learn more? Go to [employer]mel[employer].be  Enter O075 in the search box to learn more about \"Learning About Sleeping Well. \"   © 4299-7290 [employer]wise, Incorporated. Care instructions adapted under license by [facility] (which disclaims liability or warranty for this information). This care instruction is for use with your licensed healthcare professional. If you have questions about a medical condition or this instruction, always ask your healthcare professional. [place] [address] any warranty or liability for your use of this information.   Content Version: 43.9.837933; Current as of: November 14, 2014

## 2023-02-09 ENCOUNTER — HOSPITAL ENCOUNTER (OUTPATIENT)
Dept: SLEEP CENTER | Age: 58
End: 2023-02-09
Payer: MEDICARE

## 2023-02-09 PROCEDURE — 95811 POLYSOM 6/>YRS CPAP 4/> PARM: CPT

## 2023-02-16 ENCOUNTER — NURSE ONLY (OUTPATIENT)
Dept: PRIMARY CARE CLINIC | Facility: CLINIC | Age: 58
End: 2023-02-16

## 2023-02-16 DIAGNOSIS — Z13.220 ENCOUNTER FOR LIPID SCREENING FOR CARDIOVASCULAR DISEASE: ICD-10-CM

## 2023-02-16 DIAGNOSIS — Z00.00 ENCOUNTER FOR GENERAL ADULT MEDICAL EXAMINATION WITHOUT ABNORMAL FINDINGS: Primary | ICD-10-CM

## 2023-02-16 DIAGNOSIS — Z00.00 WELL ADULT EXAM: ICD-10-CM

## 2023-02-16 DIAGNOSIS — Z13.6 ENCOUNTER FOR LIPID SCREENING FOR CARDIOVASCULAR DISEASE: ICD-10-CM

## 2023-02-16 DIAGNOSIS — Z13.220 ENCOUNTER FOR LIPID SCREENING FOR CARDIOVASCULAR DISEASE: Primary | ICD-10-CM

## 2023-02-16 DIAGNOSIS — Z12.5 SCREENING FOR MALIGNANT NEOPLASM OF PROSTATE: ICD-10-CM

## 2023-02-16 DIAGNOSIS — Z13.6 ENCOUNTER FOR LIPID SCREENING FOR CARDIOVASCULAR DISEASE: Primary | ICD-10-CM

## 2023-02-16 DIAGNOSIS — Z79.899 ENCOUNTER FOR LONG-TERM CURRENT USE OF MEDICATION: ICD-10-CM

## 2023-02-16 LAB
ALBUMIN SERPL-MCNC: 3.9 G/DL (ref 3.5–5)
ALBUMIN/GLOB SERPL: 1 (ref 0.4–1.6)
ALP SERPL-CCNC: 67 U/L (ref 50–136)
ALT SERPL-CCNC: 26 U/L (ref 12–65)
ANION GAP SERPL CALC-SCNC: 5 MMOL/L (ref 2–11)
AST SERPL-CCNC: 16 U/L (ref 15–37)
BASOPHILS # BLD: 0.1 K/UL (ref 0–0.2)
BASOPHILS NFR BLD: 1 % (ref 0–2)
BILIRUB SERPL-MCNC: 0.3 MG/DL (ref 0.2–1.1)
BUN SERPL-MCNC: 15 MG/DL (ref 6–23)
CALCIUM SERPL-MCNC: 9 MG/DL (ref 8.3–10.4)
CHLORIDE SERPL-SCNC: 105 MMOL/L (ref 101–110)
CHOLEST SERPL-MCNC: 224 MG/DL
CO2 SERPL-SCNC: 27 MMOL/L (ref 21–32)
CREAT SERPL-MCNC: 1.1 MG/DL (ref 0.8–1.5)
DIFFERENTIAL METHOD BLD: NORMAL
EOSINOPHIL # BLD: 0.3 K/UL (ref 0–0.8)
EOSINOPHIL NFR BLD: 4 % (ref 0.5–7.8)
ERYTHROCYTE [DISTWIDTH] IN BLOOD BY AUTOMATED COUNT: 14.2 % (ref 11.9–14.6)
GLOBULIN SER CALC-MCNC: 3.9 G/DL (ref 2.8–4.5)
GLUCOSE SERPL-MCNC: 84 MG/DL (ref 65–100)
HCT VFR BLD AUTO: 42.8 % (ref 41.1–50.3)
HDLC SERPL-MCNC: 109 MG/DL (ref 40–60)
HDLC SERPL: 2.1
HGB BLD-MCNC: 13.8 G/DL (ref 13.6–17.2)
IMM GRANULOCYTES # BLD AUTO: 0 K/UL (ref 0–0.5)
IMM GRANULOCYTES NFR BLD AUTO: 0 % (ref 0–5)
LDLC SERPL CALC-MCNC: 105.2 MG/DL
LYMPHOCYTES # BLD: 2.4 K/UL (ref 0.5–4.6)
LYMPHOCYTES NFR BLD: 32 % (ref 13–44)
MCH RBC QN AUTO: 30.1 PG (ref 26.1–32.9)
MCHC RBC AUTO-ENTMCNC: 32.2 G/DL (ref 31.4–35)
MCV RBC AUTO: 93.2 FL (ref 82–102)
MONOCYTES # BLD: 0.6 K/UL (ref 0.1–1.3)
MONOCYTES NFR BLD: 8 % (ref 4–12)
NEUTS SEG # BLD: 4 K/UL (ref 1.7–8.2)
NEUTS SEG NFR BLD: 55 % (ref 43–78)
NRBC # BLD: 0 K/UL (ref 0–0.2)
PLATELET # BLD AUTO: 306 K/UL (ref 150–450)
PMV BLD AUTO: 10.1 FL (ref 9.4–12.3)
POTASSIUM SERPL-SCNC: 4.2 MMOL/L (ref 3.5–5.1)
PROT SERPL-MCNC: 7.8 G/DL (ref 6.3–8.2)
PSA SERPL-MCNC: 0.9 NG/ML
RBC # BLD AUTO: 4.59 M/UL (ref 4.23–5.6)
SODIUM SERPL-SCNC: 137 MMOL/L (ref 133–143)
TRIGL SERPL-MCNC: 49 MG/DL (ref 35–150)
VLDLC SERPL CALC-MCNC: 9.8 MG/DL (ref 6–23)
WBC # BLD AUTO: 7.4 K/UL (ref 4.3–11.1)

## 2023-02-21 ENCOUNTER — OFFICE VISIT (OUTPATIENT)
Dept: PRIMARY CARE CLINIC | Facility: CLINIC | Age: 58
End: 2023-02-21
Payer: MEDICARE

## 2023-02-21 VITALS
SYSTOLIC BLOOD PRESSURE: 121 MMHG | WEIGHT: 162 LBS | OXYGEN SATURATION: 98 % | TEMPERATURE: 98 F | DIASTOLIC BLOOD PRESSURE: 53 MMHG | BODY MASS INDEX: 25.43 KG/M2 | HEART RATE: 78 BPM | HEIGHT: 67 IN

## 2023-02-21 DIAGNOSIS — R35.1 BENIGN PROSTATIC HYPERPLASIA WITH NOCTURIA: ICD-10-CM

## 2023-02-21 DIAGNOSIS — Z00.00 MEDICARE ANNUAL WELLNESS VISIT, SUBSEQUENT: Primary | ICD-10-CM

## 2023-02-21 DIAGNOSIS — G47.31 COMPLEX SLEEP APNEA SYNDROME: ICD-10-CM

## 2023-02-21 DIAGNOSIS — Z86.018: ICD-10-CM

## 2023-02-21 DIAGNOSIS — N40.1 BENIGN PROSTATIC HYPERPLASIA WITH NOCTURIA: ICD-10-CM

## 2023-02-21 PROCEDURE — G0439 PPPS, SUBSEQ VISIT: HCPCS | Performed by: FAMILY MEDICINE

## 2023-02-21 PROCEDURE — 1036F TOBACCO NON-USER: CPT | Performed by: FAMILY MEDICINE

## 2023-02-21 PROCEDURE — 99213 OFFICE O/P EST LOW 20 MIN: CPT | Performed by: FAMILY MEDICINE

## 2023-02-21 PROCEDURE — G8482 FLU IMMUNIZE ORDER/ADMIN: HCPCS | Performed by: FAMILY MEDICINE

## 2023-02-21 PROCEDURE — 3017F COLORECTAL CA SCREEN DOC REV: CPT | Performed by: FAMILY MEDICINE

## 2023-02-21 PROCEDURE — G8419 CALC BMI OUT NRM PARAM NOF/U: HCPCS | Performed by: FAMILY MEDICINE

## 2023-02-21 PROCEDURE — G8427 DOCREV CUR MEDS BY ELIG CLIN: HCPCS | Performed by: FAMILY MEDICINE

## 2023-02-21 RX ORDER — LANSOPRAZOLE 30 MG/1
CAPSULE, DELAYED RELEASE ORAL
Qty: 90 CAPSULE | Refills: 1 | Status: SHIPPED | OUTPATIENT
Start: 2023-02-21

## 2023-02-21 SDOH — ECONOMIC STABILITY: HOUSING INSECURITY
IN THE LAST 12 MONTHS, WAS THERE A TIME WHEN YOU DID NOT HAVE A STEADY PLACE TO SLEEP OR SLEPT IN A SHELTER (INCLUDING NOW)?: NO

## 2023-02-21 SDOH — ECONOMIC STABILITY: FOOD INSECURITY: WITHIN THE PAST 12 MONTHS, YOU WORRIED THAT YOUR FOOD WOULD RUN OUT BEFORE YOU GOT MONEY TO BUY MORE.: NEVER TRUE

## 2023-02-21 SDOH — ECONOMIC STABILITY: FOOD INSECURITY: WITHIN THE PAST 12 MONTHS, THE FOOD YOU BOUGHT JUST DIDN'T LAST AND YOU DIDN'T HAVE MONEY TO GET MORE.: NEVER TRUE

## 2023-02-21 ASSESSMENT — PATIENT HEALTH QUESTIONNAIRE - PHQ9
1. LITTLE INTEREST OR PLEASURE IN DOING THINGS: 0
SUM OF ALL RESPONSES TO PHQ QUESTIONS 1-9: 0
SUM OF ALL RESPONSES TO PHQ QUESTIONS 1-9: 0
2. FEELING DOWN, DEPRESSED OR HOPELESS: 0
SUM OF ALL RESPONSES TO PHQ QUESTIONS 1-9: 0
SUM OF ALL RESPONSES TO PHQ QUESTIONS 1-9: 0
SUM OF ALL RESPONSES TO PHQ9 QUESTIONS 1 & 2: 0

## 2023-02-21 ASSESSMENT — LIFESTYLE VARIABLES
HOW MANY STANDARD DRINKS CONTAINING ALCOHOL DO YOU HAVE ON A TYPICAL DAY: 1 OR 2
HOW OFTEN DO YOU HAVE A DRINK CONTAINING ALCOHOL: 2-3 TIMES A WEEK

## 2023-02-21 NOTE — PROGRESS NOTES
Medicare Annual Wellness Visit    Jonelle Hopkins. is here for Medicare AWV (Patient is here for a Medicare Wellness), Discuss Labs, and Ear Fullness (Patient would like to have his left ear looked at.)    Assessment & Plan   Medicare annual wellness visit, subsequent  Complex sleep apnea syndrome  Assessment & Plan:   Monitored by specialist- no acute findings meriting change in the plan  History of benign laryngeal tumor  Benign prostatic hyperplasia with nocturia  Assessment & Plan:   Well-controlled, continue current medications, medication adherence emphasized and lifestyle modifications recommended        We reviewed his labs. He will continue current medications as prescribed. We irrigated his left ear to reveal an intact tympanic membrane. I did suggest that he get the shingles vaccine. He is scheduled to get his last COVID-vaccine. Otherwise we will see him back in 1 year. Recommendations for Preventive Services Due: see orders and patient instructions/AVS.  Recommended screening schedule for the next 5-10 years is provided to the patient in written form: see Patient Instructions/AVS.     Return in about 1 year (around 2/21/2024), or if symptoms worsen or fail to improve, for labs 1 week prior to visit. Subjective   The following acute and/or chronic problems were also addressed today:  Patient here today for annual wellness visit. He is a 75-year-old gentleman who is on disability following a bout with laryngeal cancer back in 2003. He had multiple surgeries and ultimately required tracheostomy for prolonged period of time. He ultimately did have it removed but developed some contractures in his trachea which has resulted in some chronic shortness of breath with minimal exertion. He has not been back to see ENT but admits to the dyspnea with minimal exertion. He recently had sleep study before the pandemic and was found to have severe sleep apnea.   He was lost to follow-up but did recently reestablish with pulmonology and had a follow-up study. He is scheduled to return to discuss treatment options. He otherwise has been fairly healthy. He does try to exercise as much as he can. Eats a balanced diet. He has a past history of tobacco use. He has had a colonoscopy. He had labs done and is here to review those. His PSA is normal.  He does have some mildly elevated cholesterol but has a very good HDL cholesterol. Patient's complete Health Risk Assessment and screening values have been reviewed and are found in Flowsheets. The following problems were reviewed today and where indicated follow up appointments were made and/or referrals ordered. Positive Risk Factor Screenings with Interventions:               General HRA Questions:  Select all that apply: (!) New or Increased Fatigue    Fatigue Interventions:  He has untreated sleep apnea but is in the process of getting CPAP titration. We will follow-up with fatigue does not improve. Hearing Screen:  Do you or your family notice any trouble with your hearing that hasn't been managed with hearing aids?: (!) Yes    Interventions: We irrigated his right ear to reveal intact tympanic membrane. His hearing appears to be intact. Safety:  Do you have either shower bars, grab bars, non-slip mats or non-slip surfaces in your shower or bathtub?: (!) No  Interventions:  Patient declined any further interventions or treatment       CV Risk Counseling:  Patient was asked about his current diet and exercise habits, and personalized advice was provided regarding recommended lifestyle changes. Patient's individual cardiovascular disease risk factors, including advanced age (> 54 for men, > 72 for women) and male gender, were discussed, as well as the likely benefits of lifestyle changes.  Based upon patient's motivation to change his behavior, the following plan was agreed upon to work toward lowering cardiovascular disease risk: Mediterranean diet and at least 150 minutes of exercise/week. Aspirin use for primary prevention of cardiovascular disease for men 45-79 and women 55-79: Not indicated. Educational materials for lifestyle changes were provided. Patient will follow-up in 1 year(s) with PCP. Provider spent 15 minutes counseling patient. Objective   Vitals:    02/21/23 0846   BP: (!) 121/53   Site: Left Upper Arm   Position: Sitting   Cuff Size: Large Adult   Pulse: 78   Temp: 98 °F (36.7 °C)   TempSrc: Temporal   SpO2: 98%   Weight: 162 lb (73.5 kg)   Height: 5' 7\" (1.702 m)      Body mass index is 25.37 kg/m². General Appearance: alert and oriented to person, place and time, well-developed and well-nourished, in no acute distress  Head: normocephalic and atraumatic  ENT: tympanic membrane, external ear and ear canal normal bilaterally, oropharynx clear and moist with normal mucous membranes, right tympanic membrane normal, left cerumen impaction noted, hearing grossly normal bilaterally, nose without deformity, nasal mucosa and turbinates normal without polyps, and oropharynx clear and moist with normal mucous membranes  Neck: neck supple and non tender without mass, no thyromegaly or thyroid nodules, no cervical lymphadenopathy   Pulmonary/Chest: clear to auscultation bilaterally- no wheezes, rales or rhonchi, normal air movement, no respiratory distress  Cardiovascular: normal rate, normal S1 and S2, no gallops, intact distal pulses, and no carotid bruits  Abdomen: soft, non-tender, non-distended, normal bowel sounds, no masses or organomegaly  Extremities: no cyanosis and no clubbing  Musculoskeletal: normal range of motion, no joint swelling, deformity or tenderness  Neurologic: gait and coordination normal and speech normal       Allergies   Allergen Reactions    Chlorzoxazone Itching     nausea     Prior to Visit Medications    Medication Sig Taking?  Authorizing Provider   tamsulosin (FLOMAX) 0.4 MG capsule TAKE 1 CAPSULE BY MOUTH EVERY DAY Yes Tonie Fleming MD   carisoprodol (SOMA) 350 MG tablet Take 1 tablet by mouth daily as needed for Muscle spasms for up to 90 days. Yes Tonie Fleming MD   LORazepam (ATIVAN) 0.5 MG tablet Take 1 tablet by mouth every 8 hours as needed for Anxiety for up to 180 days.  Max Daily Amount: 1.5 mg Yes Tonie Fleming MD   lansoprazole (PREVACID) 30 MG delayed release capsule TAKE 1 Arie Rockers Yes Fadia Leroy MD       [facility] (Including outside providers/suppliers regularly involved in providing care):   Patient Care Team:  Fadia Leroy MD as PCP - Rosette Crockett MD as PCP - Empaneled Provider     Reviewed and updated this visit:  Tobacco  Allergies  Meds  Problems  Med Hx  Surg Hx  Soc Hx  Fam Hx               Fadia Leroy MD

## 2023-02-21 NOTE — PATIENT INSTRUCTIONS
Fatigue: Care Instructions  Your Care Instructions     Fatigue is a feeling of tiredness, exhaustion, or lack of energy. You may feel fatigue because of too much or not enough activity. It can also come from stress, lack of sleep, boredom, and poor diet. Many medical problems, such as viral infections, can cause fatigue. Emotional problems, especially depression, are often the cause of fatigue. Fatigue is most often a symptom of another problem. Treatment for fatigue depends on the cause. For example, if you have fatigue because you have a certain health problem, treating this problem also treats your fatigue. If depression or anxiety is the cause, treatment may help. Follow-up care is a key part of your treatment and safety. Be sure to make and go to all appointments, and call your doctor if you are having problems. It's also a good idea to know your test results and keep a list of the medicines you take. How can you care for yourself at home? Get regular exercise. But don't overdo it. Go back and forth between rest and exercise. Get plenty of rest.  Eat a healthy diet. Do not skip meals, especially breakfast.  Reduce your use of caffeine, tobacco, and alcohol. Caffeine is most often found in coffee, tea, cola drinks, and chocolate. Limit medicines that can cause fatigue. This includes tranquilizers and cold and allergy medicines. When should you call for help? Watch closely for changes in your health, and be sure to contact your doctor if:    You have new symptoms such as fever or a rash.     Your fatigue gets worse.     You have been feeling down, depressed, or hopeless. Or you may have lost interest in things that you usually enjoy.     You are not getting better as expected. Where can you learn more? Go to http://www.woods.com/ and enter I069 to learn more about \"Fatigue: Care Instructions. \"  Current as of: February 9, 2022               Content Version: 13.5  © 0651-6260 Healthwise, Incorporated. Care instructions adapted under license by [facility] ([facility]). If you have questions about a medical condition or this instruction, always ask your healthcare professional. [address] any warranty or liability for your use of this information. Advance Directives: Care Instructions  Overview  An advance directive is a legal way to state your wishes at the end of your life. It tells your family and your doctor what to do if you can't say what you want. There are two main types of advance directives. You can change them any time your wishes change. Living will. This form tells your family and your doctor your wishes about life support and other treatment. The form is also called a declaration. Medical power of . This form lets you name a person to make treatment decisions for you when you can't speak for yourself. This person is called a health care agent (health care proxy, health care surrogate). The form is also called a durable power of  for health care. If you do not have an advance directive, decisions about your medical care may be made by a family member, or by a doctor or a  who doesn't know you. It may help to think of an advance directive as a gift to the people who care for you. If you have one, they won't have to make tough decisions by themselves. For more information, including forms for your state, see the [address] website (www.caringinfo.org/planning/advance-directives/). Follow-up care is a key part of your treatment and safety. Be sure to make and go to all appointments, and call your doctor if you are having problems. It's also a good idea to know your test results and keep a list of the medicines you take. What should you include in an advance directive? Many states have a unique advance directive form.  (It may ask you to address specific issues.) Or you might use a universal form that's approved by many states. If your form doesn't tell you what to address, it may be hard to know what to include in your advance directive. Use the questions below to help you get started. Who do you want to make decisions about your medical care if you are not able to? What life-support measures do you want if you have a serious illness that gets worse over time or can't be cured? What are you most afraid of that might happen? (Maybe you're afraid of having pain, losing your independence, or being kept alive by machines.)  Where would you prefer to die? (Your home? A hospital? A nursing home?)  Do you want to donate your organs when you die? Do you want certain Spiritism practices performed before you die? When should you call for help? Be sure to contact your doctor if you have any questions. Where can you learn more? Go to http://www.ramirez.com/ and enter R264 to learn more about \"Advance Directives: Care Instructions. \"  Current as of: June 16, 2022               Content Version: 13.5  © 2006-2022 [employer]. Care instructions adapted under license by [facility] ([facility]). If you have questions about a medical condition or this instruction, always ask your healthcare professional. [place] [address] any warranty or liability for your use of this information. A Healthy Heart: Care Instructions  Your Care Instructions     Coronary artery disease, also called heart disease, occurs when a substance called plaque builds up in the vessels that supply oxygen-rich blood to your heart muscle. This can narrow the blood vessels and reduce blood flow. A heart attack happens when blood flow is completely blocked. A high-fat diet, smoking, and other factors increase the risk of heart disease. Your doctor has found that you have a chance of having heart disease. You can do lots of things to keep your heart healthy.  It may not be easy, but you can change your diet, exercise more, and quit smoking. These steps really work to lower your chance of heart disease. Follow-up care is a key part of your treatment and safety. Be sure to make and go to all appointments, and call your doctor if you are having problems. It's also a good idea to know your test results and keep a list of the medicines you take. How can you care for yourself at home? Diet    Use less salt when you cook and eat. This helps lower your blood pressure. Taste food before salting. Add only a little salt when you think you need it. With time, your taste buds will adjust to less salt.     Eat fewer snack items, fast foods, canned soups, and other high-salt, high-fat, processed foods.     Read food labels and try to avoid saturated and trans fats. They increase your risk of heart disease by raising cholesterol levels.     Limit the amount of solid fat-butter, margarine, and shortening-you eat. Use olive, peanut, or canola oil when you cook. Bake, broil, and steam foods instead of frying them.     Eat a variety of fruit and vegetables every day. Dark green, deep orange, red, or yellow fruits and vegetables are especially good for you. Examples include spinach, carrots, peaches, and berries.     Foods high in fiber can reduce your cholesterol and provide important vitamins and minerals. High-fiber foods include whole-grain cereals and breads, oatmeal, beans, brown rice, citrus fruits, and apples.     Eat lean proteins. Heart-healthy proteins include seafood, lean meats and poultry, eggs, beans, peas, nuts, seeds, and soy products.     Limit drinks and foods with added sugar. These include candy, desserts, and soda pop. Lifestyle changes    If your doctor recommends it, get more exercise. Walking is a good choice. Bit by bit, increase the amount you walk every day. Try for at least 30 minutes on most days of the week. You also may want to swim, bike, or do other activities.     Do not smoke.  If you need help quitting, talk to your doctor about stop-smoking programs and medicines. These can increase your chances of quitting for good. Quitting smoking may be the most important step you can take to protect your heart. It is never too late to quit.     Limit alcohol to 2 drinks a day for men and 1 drink a day for women. Too much alcohol can cause health problems.     Manage other health problems such as diabetes, high blood pressure, and high cholesterol. If you think you may have a problem with alcohol or drug use, talk to your doctor. Medicines    Take your medicines exactly as prescribed. Call your doctor if you think you are having a problem with your medicine.     If your doctor recommends aspirin, take the amount directed each day. Make sure you take aspirin and not another kind of pain reliever, such as acetaminophen (Tylenol). When should you call for help? Call 911 if you have symptoms of a heart attack. These may include:    Chest pain or pressure, or a strange feeling in the chest.     Sweating.     Shortness of breath.     Pain, pressure, or a strange feeling in the back, neck, jaw, or upper belly or in one or both shoulders or arms.     Lightheadedness or sudden weakness.     A fast or irregular heartbeat. After you call 911, the  may tell you to chew 1 adult-strength or 2 to 4 low-dose aspirin. Wait for an ambulance. Do not try to drive yourself. Watch closely for changes in your health, and be sure to contact your doctor if you have any problems. Where can you learn more? Go to http://www.ramirez.com/ and enter F075 to learn more about \"A Healthy Heart: Care Instructions. \"  Current as of: September 7, 2022               Content Version: 13.5  © 7187-7069 Healthwise, Incorporated. Care instructions adapted under license by [facility] ([facility]).  If you have questions about a medical condition or this instruction, always ask your healthcare professional. Glynn Anderson disclaims any warranty or liability for your use of this information. Personalized Preventive Plan for Greg Saldana. - 2/21/2023  Medicare offers a range of preventive health benefits. Some of the tests and screenings are paid in full while other may be subject to a deductible, co-insurance, and/or copay. Some of these benefits include a comprehensive review of your medical history including lifestyle, illnesses that may run in your family, and various assessments and screenings as appropriate. After reviewing your medical record and screening and assessments performed today your provider may have ordered immunizations, labs, imaging, and/or referrals for you. A list of these orders (if applicable) as well as your Preventive Care list are included within your After Visit Summary for your review. Other Preventive Recommendations:    A preventive eye exam performed by an eye specialist is recommended every 1-2 years to screen for glaucoma; cataracts, macular degeneration, and other eye disorders. A preventive dental visit is recommended every 6 months. Try to get at least 150 minutes of exercise per week or 10,000 steps per day on a pedometer . Order or download the FREE \"Exercise & Physical Activity: Your Everyday Guide\" from [employer]. Call 3-695.208.6418 or search [employer] online. You need 0108-2231 mg of calcium and 7292-7366 IU of vitamin D per day. It is possible to meet your calcium requirement with diet alone, but a vitamin D supplement is usually necessary to meet this goal.  When exposed to the sun, use a sunscreen that protects against both UVA and UVB radiation with an SPF of 30 or greater. Reapply every 2 to 3 hours or after sweating, drying off with a towel, or swimming. Always wear a seat belt when traveling in a car. Always wear a helmet when riding a bicycle or motorcycle.

## 2023-02-27 ENCOUNTER — TELEPHONE (OUTPATIENT)
Dept: SLEEP MEDICINE | Age: 58
End: 2023-02-27

## 2023-02-27 DIAGNOSIS — G47.31 COMPLEX SLEEP APNEA SYNDROME: Primary | ICD-10-CM

## 2023-02-27 DIAGNOSIS — G47.31 CENTRAL SLEEP APNEA: ICD-10-CM

## 2023-02-27 NOTE — TELEPHONE ENCOUNTER
Patient had recent sleep study showing severe sleep apnea. Cpap therapy is recommended per sleep interp. Patient has agreed to start CPAP therapy. Order needs to be sent to [address] [place] [address].      Ciarra Hoffmann CMA

## 2023-05-02 ENCOUNTER — OFFICE VISIT (OUTPATIENT)
Dept: PRIMARY CARE CLINIC | Facility: CLINIC | Age: 58
End: 2023-05-02
Payer: MEDICARE

## 2023-05-02 VITALS
TEMPERATURE: 97.7 F | HEART RATE: 71 BPM | BODY MASS INDEX: 25.11 KG/M2 | SYSTOLIC BLOOD PRESSURE: 111 MMHG | HEIGHT: 67 IN | OXYGEN SATURATION: 98 % | WEIGHT: 160 LBS | DIASTOLIC BLOOD PRESSURE: 68 MMHG

## 2023-05-02 DIAGNOSIS — R35.1 BENIGN PROSTATIC HYPERPLASIA WITH NOCTURIA: ICD-10-CM

## 2023-05-02 DIAGNOSIS — M67.40 GANGLION CYST OF FLEXOR TENDON SHEATH: Primary | ICD-10-CM

## 2023-05-02 DIAGNOSIS — F41.1 GENERALIZED ANXIETY DISORDER: ICD-10-CM

## 2023-05-02 DIAGNOSIS — N40.1 BENIGN PROSTATIC HYPERPLASIA WITH NOCTURIA: ICD-10-CM

## 2023-05-02 PROCEDURE — G8419 CALC BMI OUT NRM PARAM NOF/U: HCPCS | Performed by: FAMILY MEDICINE

## 2023-05-02 PROCEDURE — 1036F TOBACCO NON-USER: CPT | Performed by: FAMILY MEDICINE

## 2023-05-02 PROCEDURE — 3017F COLORECTAL CA SCREEN DOC REV: CPT | Performed by: FAMILY MEDICINE

## 2023-05-02 PROCEDURE — 99214 OFFICE O/P EST MOD 30 MIN: CPT | Performed by: FAMILY MEDICINE

## 2023-05-02 PROCEDURE — G8427 DOCREV CUR MEDS BY ELIG CLIN: HCPCS | Performed by: FAMILY MEDICINE

## 2023-05-02 RX ORDER — TAMSULOSIN HYDROCHLORIDE 0.4 MG/1
CAPSULE ORAL
Qty: 90 CAPSULE | Refills: 3 | Status: SHIPPED | OUTPATIENT
Start: 2023-05-02

## 2023-05-02 RX ORDER — LORAZEPAM 0.5 MG/1
0.5 TABLET ORAL EVERY 8 HOURS PRN
Qty: 30 TABLET | Refills: 2 | Status: SHIPPED | OUTPATIENT
Start: 2023-05-02 | End: 2023-10-29

## 2023-05-02 SDOH — ECONOMIC STABILITY: INCOME INSECURITY: HOW HARD IS IT FOR YOU TO PAY FOR THE VERY BASICS LIKE FOOD, HOUSING, MEDICAL CARE, AND HEATING?: NOT VERY HARD

## 2023-05-02 SDOH — ECONOMIC STABILITY: FOOD INSECURITY: WITHIN THE PAST 12 MONTHS, THE FOOD YOU BOUGHT JUST DIDN'T LAST AND YOU DIDN'T HAVE MONEY TO GET MORE.: NEVER TRUE

## 2023-05-02 SDOH — ECONOMIC STABILITY: FOOD INSECURITY: WITHIN THE PAST 12 MONTHS, YOU WORRIED THAT YOUR FOOD WOULD RUN OUT BEFORE YOU GOT MONEY TO BUY MORE.: NEVER TRUE

## 2023-05-02 ASSESSMENT — PATIENT HEALTH QUESTIONNAIRE - PHQ9
SUM OF ALL RESPONSES TO PHQ QUESTIONS 1-9: 0
1. LITTLE INTEREST OR PLEASURE IN DOING THINGS: 0
2. FEELING DOWN, DEPRESSED OR HOPELESS: 0
SUM OF ALL RESPONSES TO PHQ QUESTIONS 1-9: 0
SUM OF ALL RESPONSES TO PHQ9 QUESTIONS 1 & 2: 0

## 2023-05-02 NOTE — PROGRESS NOTES
[facility] PRIMARY CARE  Tonie Thompson M.D.  [address]  [address]  Ph No:  [phone]  Fax:  [phone]:  Chief Complaint   Patient presents with    Hand Pain     Patient presents with a knot on the inside of his right hand between the 5th and 4th digit. He noticed it 3-4 weeks ago. Anxiety     Patient is requesting refills. Benign Prostatic Hypertrophy     Patient is requesting refills. IMPRESSION/PLAN    1. Ganglion cyst of flexor tendon sheath  2. Benign prostatic hyperplasia with nocturia  -     tamsulosin (FLOMAX) 0.4 MG capsule; TAKE 1 CAPSULE BY MOUTH EVERY DAY, Disp-90 capsule, R-3Normal  3. Generalized anxiety disorder  -     LORazepam (ATIVAN) 0.5 MG tablet; Take 1 tablet by mouth every 8 hours as needed for Anxiety for up to 180 days. Max Daily Amount: 1.5 mg, Disp-30 tablet, R-2Normal      Reassurance given. Call if it is not improved in the next 3-4 weeks. Will refer to ortho for eval. If not better. Refilled medicaitons. Take lorazepam only as needed. We discussed the expected course, resolution and complications of the diagnosis(es) in detail. Medication risks, benefits, costs, interactions, and alternatives were discussed as indicated. I advised pt to contact the office if condition worsens, changes or fails to improve as anticipated. Pt expressed understanding with the diagnosis(es) and plan. HISTORY OF PRESENT ILLNESS:  Here wi co nodule on right hand that developed about 3 weeks ago. Was working with a tool and feels he may have injured his had at that time. It is unchanged in size. Will cause pain if he  a certain way. He feels it move when he contracts his finger. Is at the area below the base of the small finger. No other nodules present. He also requests refills of meds. Take ativan only as needed. Not needing as much since he has been using cpap regularly.   No other co.         REVIEW OF

## 2023-06-30 ENCOUNTER — TELEPHONE (OUTPATIENT)
Dept: SLEEP MEDICINE | Age: 58
End: 2023-06-30

## 2023-06-30 ENCOUNTER — OFFICE VISIT (OUTPATIENT)
Dept: SLEEP MEDICINE | Age: 58
End: 2023-06-30
Payer: MEDICARE

## 2023-06-30 VITALS
TEMPERATURE: 97.1 F | DIASTOLIC BLOOD PRESSURE: 72 MMHG | HEIGHT: 67 IN | OXYGEN SATURATION: 100 % | BODY MASS INDEX: 25.11 KG/M2 | SYSTOLIC BLOOD PRESSURE: 113 MMHG | WEIGHT: 160 LBS | HEART RATE: 62 BPM

## 2023-06-30 DIAGNOSIS — G47.34 NOCTURNAL HYPOXEMIA: ICD-10-CM

## 2023-06-30 DIAGNOSIS — G47.00 INSOMNIA WITH SLEEP APNEA: ICD-10-CM

## 2023-06-30 DIAGNOSIS — G47.31 CENTRAL SLEEP APNEA: ICD-10-CM

## 2023-06-30 DIAGNOSIS — G47.30 INSOMNIA WITH SLEEP APNEA: ICD-10-CM

## 2023-06-30 DIAGNOSIS — G47.31 COMPLEX SLEEP APNEA SYNDROME: Primary | ICD-10-CM

## 2023-06-30 DIAGNOSIS — G47.10 HYPERSOMNIA: ICD-10-CM

## 2023-06-30 PROCEDURE — G8419 CALC BMI OUT NRM PARAM NOF/U: HCPCS | Performed by: STUDENT IN AN ORGANIZED HEALTH CARE EDUCATION/TRAINING PROGRAM

## 2023-06-30 PROCEDURE — 1036F TOBACCO NON-USER: CPT | Performed by: STUDENT IN AN ORGANIZED HEALTH CARE EDUCATION/TRAINING PROGRAM

## 2023-06-30 PROCEDURE — G8427 DOCREV CUR MEDS BY ELIG CLIN: HCPCS | Performed by: STUDENT IN AN ORGANIZED HEALTH CARE EDUCATION/TRAINING PROGRAM

## 2023-06-30 PROCEDURE — 99214 OFFICE O/P EST MOD 30 MIN: CPT | Performed by: STUDENT IN AN ORGANIZED HEALTH CARE EDUCATION/TRAINING PROGRAM

## 2023-06-30 PROCEDURE — 3017F COLORECTAL CA SCREEN DOC REV: CPT | Performed by: STUDENT IN AN ORGANIZED HEALTH CARE EDUCATION/TRAINING PROGRAM

## 2023-06-30 ASSESSMENT — SLEEP AND FATIGUE QUESTIONNAIRES
HOW LIKELY ARE YOU TO NOD OFF OR FALL ASLEEP IN A CAR, WHILE STOPPED FOR A FEW MINUTES IN TRAFFIC: 0
HOW LIKELY ARE YOU TO NOD OFF OR FALL ASLEEP WHILE SITTING INACTIVE IN A PUBLIC PLACE: 1
HOW LIKELY ARE YOU TO NOD OFF OR FALL ASLEEP WHILE LYING DOWN TO REST IN THE AFTERNOON WHEN CIRCUMSTANCES PERMIT: 3
ESS TOTAL SCORE: 8
HOW LIKELY ARE YOU TO NOD OFF OR FALL ASLEEP WHILE SITTING AND READING: 0
HOW LIKELY ARE YOU TO NOD OFF OR FALL ASLEEP WHILE SITTING AND TALKING TO SOMEONE: 1
HOW LIKELY ARE YOU TO NOD OFF OR FALL ASLEEP WHILE WATCHING TV: 1
HOW LIKELY ARE YOU TO NOD OFF OR FALL ASLEEP WHILE SITTING QUIETLY AFTER LUNCH WITHOUT ALCOHOL: 1
HOW LIKELY ARE YOU TO NOD OFF OR FALL ASLEEP WHEN YOU ARE A PASSENGER IN A CAR FOR AN HOUR WITHOUT A BREAK: 1

## 2023-09-12 ENCOUNTER — TELEPHONE (OUTPATIENT)
Dept: PRIMARY CARE CLINIC | Facility: CLINIC | Age: 58
End: 2023-09-12

## 2023-09-12 NOTE — TELEPHONE ENCOUNTER
Called pt to reschedule 11/2/23 appt due to Dr. Hammad Ahuja being ooo -  did not answer - lvm to call back to reschedule    Sending [employer] message and letter

## 2023-10-03 DIAGNOSIS — M54.12 RADICULOPATHY, CERVICAL REGION: ICD-10-CM

## 2023-10-04 RX ORDER — CARISOPRODOL 350 MG/1
350 TABLET ORAL DAILY PRN
Qty: 30 TABLET | Refills: 2 | Status: SHIPPED | OUTPATIENT
Start: 2023-10-04 | End: 2024-01-02

## 2023-10-10 RX ORDER — LANSOPRAZOLE 30 MG/1
CAPSULE, DELAYED RELEASE ORAL
Qty: 90 CAPSULE | Refills: 1 | Status: SHIPPED | OUTPATIENT
Start: 2023-10-10

## 2023-10-17 ENCOUNTER — OFFICE VISIT (OUTPATIENT)
Dept: SLEEP MEDICINE | Age: 58
End: 2023-10-17
Payer: MEDICARE

## 2023-10-17 VITALS
BODY MASS INDEX: 25.74 KG/M2 | TEMPERATURE: 97.3 F | OXYGEN SATURATION: 98 % | WEIGHT: 164 LBS | SYSTOLIC BLOOD PRESSURE: 118 MMHG | HEIGHT: 67 IN | RESPIRATION RATE: 18 BRPM | HEART RATE: 83 BPM | DIASTOLIC BLOOD PRESSURE: 72 MMHG

## 2023-10-17 DIAGNOSIS — G47.33 OSA (OBSTRUCTIVE SLEEP APNEA): Primary | ICD-10-CM

## 2023-10-17 PROCEDURE — 99215 OFFICE O/P EST HI 40 MIN: CPT | Performed by: INTERNAL MEDICINE

## 2023-10-17 PROCEDURE — G8427 DOCREV CUR MEDS BY ELIG CLIN: HCPCS | Performed by: INTERNAL MEDICINE

## 2023-10-17 PROCEDURE — G8419 CALC BMI OUT NRM PARAM NOF/U: HCPCS | Performed by: INTERNAL MEDICINE

## 2023-10-17 PROCEDURE — G8484 FLU IMMUNIZE NO ADMIN: HCPCS | Performed by: INTERNAL MEDICINE

## 2023-10-17 PROCEDURE — 3017F COLORECTAL CA SCREEN DOC REV: CPT | Performed by: INTERNAL MEDICINE

## 2023-10-17 PROCEDURE — 1036F TOBACCO NON-USER: CPT | Performed by: INTERNAL MEDICINE

## 2023-10-17 ASSESSMENT — SLEEP AND FATIGUE QUESTIONNAIRES
HOW LIKELY ARE YOU TO NOD OFF OR FALL ASLEEP WHILE SITTING INACTIVE IN A PUBLIC PLACE: 2
HOW LIKELY ARE YOU TO NOD OFF OR FALL ASLEEP WHILE WATCHING TV: 3
HOW LIKELY ARE YOU TO NOD OFF OR FALL ASLEEP IN A CAR, WHILE STOPPED FOR A FEW MINUTES IN TRAFFIC: 2
HOW LIKELY ARE YOU TO NOD OFF OR FALL ASLEEP WHILE SITTING QUIETLY AFTER LUNCH WITHOUT ALCOHOL: 2
HOW LIKELY ARE YOU TO NOD OFF OR FALL ASLEEP WHILE SITTING AND READING: 3
HOW LIKELY ARE YOU TO NOD OFF OR FALL ASLEEP WHEN YOU ARE A PASSENGER IN A CAR FOR AN HOUR WITHOUT A BREAK: 3
HOW LIKELY ARE YOU TO NOD OFF OR FALL ASLEEP WHILE LYING DOWN TO REST IN THE AFTERNOON WHEN CIRCUMSTANCES PERMIT: 3
HOW LIKELY ARE YOU TO NOD OFF OR FALL ASLEEP WHILE SITTING AND TALKING TO SOMEONE: 1
ESS TOTAL SCORE: 19

## 2023-10-17 NOTE — ASSESSMENT & PLAN NOTE
Patient has severe sleep apnea AHI of 42, without treatment emergent centrals on CPAP, is doing fairly well on bilevel 11/7, still not ideally controlled will leak up to 12/8 again, pressure change made in clinic today. We did discuss a repeat titration but he would like to hold off because of cost.  We will pull a download in 3 to 4 weeks and call him to discuss. We once again discussed inspire therapy and he would be an excellent candidate, pressure given and should he be if that he is to simply give us a call. We also will get a full facemask Vitera to help with leak as he is having significant leak with his oronasal mask. We also discussed using a skullcap to help with skin irritation from Straps. We will see him back in 3 to 4 months.

## 2023-10-17 NOTE — PROGRESS NOTES
[address]  [phone]    PATIENT ID    Mr. Salazar Ano  1965  His primary provider is Kevin Arredondo MD    CC: Mr. Brett Caraballo returns to the clinic today for follow up of his obstructive sleep apnea. INTERVAL HISTORY  Mr. Brett Caraballo was originally diagnosed with severe obstructive sleep apnea in 2022 with an AHI of 47 and a low SPO2 of 76%. 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 Last visit he was dropped to 11 over 7 cm H2O, he struggling with leak to clear enzymes using the oronasal DreamWear mask. He has tried nasal mask in the past with mouth leak and did not do well with a chinstrap. He has not given inspire or any other consideration as he felt he would not be a candidate. He has no new concerns today other than he is having some skin irritation over his temporal region from the CPAP mask headgear. He still wakes up with headaches on occasion and still has significant sleepiness. He can definitely tell an improvement that when he uses his machine versus not. He does occasionally struggle with insomnia issues and situation when he is having life issues but lately this has not been a problem. He does note since starting BiPAP he does not need to get up and  void at night and is happy about this. He was last [facility] in 6/30/2023 and was compliant and getting good clinical benefits from BiPAP therapy however struggling with leak and starting pressure being too low, pressure empirically adjusted to 11 over 7 cm H2O  Since implementing BiPAP therapy Mr. Brett Caraballo feels somewhat greater more rested and less fatigued.   On BiPAP download review today he is utilizing hisBiPAP machine greater than 80%

## 2023-10-26 DIAGNOSIS — Z79.899 ENCOUNTER FOR LONG-TERM CURRENT USE OF MEDICATION: Primary | ICD-10-CM

## 2023-10-26 DIAGNOSIS — Z13.6 SCREENING FOR CARDIOVASCULAR CONDITION: ICD-10-CM

## 2023-11-08 ENCOUNTER — TELEPHONE (OUTPATIENT)
Dept: SLEEP MEDICINE | Age: 58
End: 2023-11-08

## 2023-11-08 DIAGNOSIS — G47.33 OSA (OBSTRUCTIVE SLEEP APNEA): Primary | ICD-10-CM

## 2023-11-08 NOTE — TELEPHONE ENCOUNTER
Order sent to Dr. Starr Munson for inspire. Contact pt through [facility]  to let him know the referral was sent. Pt order for supplies was processed on 10/23/23. ----- Message from Tad Vasquez. Geno Salazar. sent at 11/2/2023 11:57 AM EDT -----  Regarding: Sonia De La Vega: [phone]  I would like to move forward with Inspire.  I have not received any communication regarding the supplies that you ordered for me on 10/17/23

## 2023-11-17 ENCOUNTER — TELEPHONE (OUTPATIENT)
Dept: SLEEP MEDICINE | Age: 58
End: 2023-11-17

## 2023-11-17 NOTE — TELEPHONE ENCOUNTER
Pt has cloud 2 cloud machine. He will bring need to bring his machine to be read.----- Message from Gunnar Gómez III, MD sent at 10/17/2023 11:18 AM EDT -----  Regarding: ? download  This may be a repeat request if you have already done it over the past week disregard.

## 2023-12-13 ENCOUNTER — OFFICE VISIT (OUTPATIENT)
Dept: PRIMARY CARE CLINIC | Facility: CLINIC | Age: 58
End: 2023-12-13
Payer: MEDICARE

## 2023-12-13 VITALS
OXYGEN SATURATION: 100 % | HEART RATE: 74 BPM | BODY MASS INDEX: 25.74 KG/M2 | WEIGHT: 164 LBS | HEIGHT: 67 IN | SYSTOLIC BLOOD PRESSURE: 129 MMHG | TEMPERATURE: 98.1 F | DIASTOLIC BLOOD PRESSURE: 68 MMHG

## 2023-12-13 DIAGNOSIS — K21.00 GASTROESOPHAGEAL REFLUX DISEASE WITH ESOPHAGITIS WITHOUT HEMORRHAGE: ICD-10-CM

## 2023-12-13 DIAGNOSIS — R07.9 CHEST PAIN AT REST: ICD-10-CM

## 2023-12-13 DIAGNOSIS — R35.1 BENIGN PROSTATIC HYPERPLASIA WITH NOCTURIA: ICD-10-CM

## 2023-12-13 DIAGNOSIS — M62.838 MUSCLE SPASM: ICD-10-CM

## 2023-12-13 DIAGNOSIS — N40.1 BENIGN PROSTATIC HYPERPLASIA WITH NOCTURIA: ICD-10-CM

## 2023-12-13 DIAGNOSIS — R07.9 CHEST PAIN AT REST: Primary | ICD-10-CM

## 2023-12-13 DIAGNOSIS — Z23 FLU VACCINE NEED: ICD-10-CM

## 2023-12-13 DIAGNOSIS — R25.2 CRAMP AND SPASM: ICD-10-CM

## 2023-12-13 DIAGNOSIS — G47.33 OSA (OBSTRUCTIVE SLEEP APNEA): ICD-10-CM

## 2023-12-13 DIAGNOSIS — M54.12 RADICULOPATHY, CERVICAL REGION: ICD-10-CM

## 2023-12-13 DIAGNOSIS — E55.9 VITAMIN D DEFICIENCY, UNSPECIFIED: ICD-10-CM

## 2023-12-13 DIAGNOSIS — E78.2 MIXED HYPERLIPIDEMIA: ICD-10-CM

## 2023-12-13 DIAGNOSIS — H61.22 IMPACTED CERUMEN OF LEFT EAR: ICD-10-CM

## 2023-12-13 LAB
25(OH)D3 SERPL-MCNC: 10 NG/ML (ref 30–100)
ALBUMIN SERPL-MCNC: 4 G/DL (ref 3.2–4.6)
ALBUMIN/GLOB SERPL: 1.2 (ref 0.4–1.6)
ALP SERPL-CCNC: 72 U/L (ref 50–136)
ALT SERPL-CCNC: 23 U/L (ref 12–65)
ANION GAP SERPL CALC-SCNC: 3 MMOL/L (ref 2–11)
AST SERPL-CCNC: 12 U/L (ref 15–37)
BASOPHILS # BLD: 0.1 K/UL (ref 0–0.2)
BASOPHILS NFR BLD: 1 % (ref 0–2)
BILIRUB SERPL-MCNC: 0.4 MG/DL (ref 0.2–1.1)
BUN SERPL-MCNC: 17 MG/DL (ref 8–23)
CALCIUM SERPL-MCNC: 9.3 MG/DL (ref 8.3–10.4)
CHLORIDE SERPL-SCNC: 108 MMOL/L (ref 103–113)
CHOLEST SERPL-MCNC: 219 MG/DL
CK SERPL-CCNC: 130 U/L (ref 21–215)
CO2 SERPL-SCNC: 27 MMOL/L (ref 21–32)
CREAT SERPL-MCNC: 1.1 MG/DL (ref 0.8–1.5)
DIFFERENTIAL METHOD BLD: NORMAL
EOSINOPHIL # BLD: 0.3 K/UL (ref 0–0.8)
EOSINOPHIL NFR BLD: 4 % (ref 0.5–7.8)
ERYTHROCYTE [DISTWIDTH] IN BLOOD BY AUTOMATED COUNT: 13.7 % (ref 11.9–14.6)
GLOBULIN SER CALC-MCNC: 3.4 G/DL (ref 2.8–4.5)
GLUCOSE SERPL-MCNC: 84 MG/DL (ref 65–100)
HCT VFR BLD AUTO: 42.9 % (ref 41.1–50.3)
HDLC SERPL-MCNC: 95 MG/DL (ref 40–60)
HDLC SERPL: 2.3
HGB BLD-MCNC: 13.6 G/DL (ref 13.6–17.2)
IMM GRANULOCYTES # BLD AUTO: 0 K/UL (ref 0–0.5)
IMM GRANULOCYTES NFR BLD AUTO: 0 % (ref 0–5)
LDLC SERPL CALC-MCNC: 116.6 MG/DL
LYMPHOCYTES # BLD: 2 K/UL (ref 0.5–4.6)
LYMPHOCYTES NFR BLD: 28 % (ref 13–44)
MAGNESIUM SERPL-MCNC: 2.2 MG/DL (ref 1.8–2.4)
MCH RBC QN AUTO: 29.5 PG (ref 26.1–32.9)
MCHC RBC AUTO-ENTMCNC: 31.7 G/DL (ref 31.4–35)
MCV RBC AUTO: 93.1 FL (ref 82–102)
MONOCYTES # BLD: 0.6 K/UL (ref 0.1–1.3)
MONOCYTES NFR BLD: 8 % (ref 4–12)
NEUTS SEG # BLD: 4.2 K/UL (ref 1.7–8.2)
NEUTS SEG NFR BLD: 59 % (ref 43–78)
NRBC # BLD: 0 K/UL (ref 0–0.2)
PLATELET # BLD AUTO: 269 K/UL (ref 150–450)
PMV BLD AUTO: 9.6 FL (ref 9.4–12.3)
POTASSIUM SERPL-SCNC: 4.5 MMOL/L (ref 3.5–5.1)
PROT SERPL-MCNC: 7.4 G/DL (ref 6.3–8.2)
RBC # BLD AUTO: 4.61 M/UL (ref 4.23–5.6)
SODIUM SERPL-SCNC: 138 MMOL/L (ref 136–146)
TRIGL SERPL-MCNC: 37 MG/DL (ref 35–150)
TSH, 3RD GENERATION: 0.85 UIU/ML (ref 0.36–3.74)
VIT B12 SERPL-MCNC: 326 PG/ML (ref 193–986)
VLDLC SERPL CALC-MCNC: 7.4 MG/DL (ref 6–23)
WBC # BLD AUTO: 7.2 K/UL (ref 4.3–11.1)

## 2023-12-13 PROCEDURE — 69210 REMOVE IMPACTED EAR WAX UNI: CPT | Performed by: FAMILY MEDICINE

## 2023-12-13 PROCEDURE — 1036F TOBACCO NON-USER: CPT | Performed by: FAMILY MEDICINE

## 2023-12-13 PROCEDURE — G8482 FLU IMMUNIZE ORDER/ADMIN: HCPCS | Performed by: FAMILY MEDICINE

## 2023-12-13 PROCEDURE — 3017F COLORECTAL CA SCREEN DOC REV: CPT | Performed by: FAMILY MEDICINE

## 2023-12-13 PROCEDURE — G0008 ADMIN INFLUENZA VIRUS VAC: HCPCS | Performed by: FAMILY MEDICINE

## 2023-12-13 PROCEDURE — G8419 CALC BMI OUT NRM PARAM NOF/U: HCPCS | Performed by: FAMILY MEDICINE

## 2023-12-13 PROCEDURE — 99214 OFFICE O/P EST MOD 30 MIN: CPT | Performed by: FAMILY MEDICINE

## 2023-12-13 PROCEDURE — G8427 DOCREV CUR MEDS BY ELIG CLIN: HCPCS | Performed by: FAMILY MEDICINE

## 2023-12-13 PROCEDURE — 90674 CCIIV4 VAC NO PRSV 0.5 ML IM: CPT | Performed by: FAMILY MEDICINE

## 2023-12-13 RX ORDER — CARISOPRODOL 350 MG/1
350 TABLET ORAL DAILY PRN
Qty: 30 TABLET | Refills: 2 | Status: SHIPPED | OUTPATIENT
Start: 2023-12-13 | End: 2024-03-12

## 2023-12-13 RX ORDER — TAMSULOSIN HYDROCHLORIDE 0.4 MG/1
CAPSULE ORAL
Qty: 90 CAPSULE | Refills: 3 | Status: SHIPPED | OUTPATIENT
Start: 2023-12-13

## 2023-12-13 NOTE — ASSESSMENT & PLAN NOTE
Monitored by specialist- no acute findings meriting change in the plan. Currently in process of evaluation for Inspire procedure.

## 2023-12-13 NOTE — PROGRESS NOTES
[facility]  Tonie Tomlinson M.D.  [address] [place] [address], [address] [facility]  Ph No:  [phone]  Fax:  [phone]:  Chief Complaint   Patient presents with    6 Month Follow-Up     Labs are due. Has been experiencing an occasional sharp chest pain that started about 3 weeks ago. IMPRESSION/PLAN    1. Chest pain at rest  -     CK; Future  -     High sensitivity CRP; Future  -     Comprehensive Metabolic Panel; Future  -     TSH; Future  -     CBC with Auto Differential; Future  -     Lipid Panel; Future  -     Magnesium; Future  -     Vitamin B12; Future  -     Vitamin D 25 Hydroxy; Future  2. Mixed hyperlipidemia  3. Benign prostatic hyperplasia with nocturia  -     tamsulosin (FLOMAX) 0.4 MG capsule; TAKE 1 CAPSULE BY MOUTH EVERY DAY, Disp-90 capsule, R-3Normal  -     CK; Future  -     High sensitivity CRP; Future  -     Comprehensive Metabolic Panel; Future  -     TSH; Future  -     CBC with Auto Differential; Future  -     Lipid Panel; Future  -     Magnesium; Future  -     Vitamin B12; Future  -     Vitamin D 25 Hydroxy; Future  4. DUDLEY (obstructive sleep apnea)  Assessment & Plan:   Monitored by specialist- no acute findings meriting change in the plan. Currently in process of evaluation for Inspire procedure. 5. Cramp and spasm  -     TSH; Future  6. Radiculopathy, cervical region  -     carisoprodol (SOMA) 350 MG tablet; Take 1 tablet by mouth daily as needed for Muscle spasms for up to 90 days. , Disp-30 tablet, R-2Normal  -     CK; Future  -     High sensitivity CRP; Future  -     Comprehensive Metabolic Panel; Future  -     TSH; Future  -     CBC with Auto Differential; Future  -     Lipid Panel; Future  -     Magnesium; Future  -     Vitamin B12; Future  -     Vitamin D 25 Hydroxy; Future  7. Gastroesophageal reflux disease with esophagitis without hemorrhage  -     CK; Future  -     High sensitivity CRP;  Future  -     Comprehensive Metabolic Panel;

## 2023-12-14 RX ORDER — ERGOCALCIFEROL 1.25 MG/1
50000 CAPSULE ORAL WEEKLY
Qty: 4 CAPSULE | Refills: 5 | Status: SHIPPED | OUTPATIENT
Start: 2023-12-14

## 2024-01-12 ENCOUNTER — TELEPHONE (OUTPATIENT)
Dept: SLEEP MEDICINE | Age: 59
End: 2024-01-12

## 2024-01-12 NOTE — TELEPHONE ENCOUNTER
Patient was on my schedule today and fell off at the last minute, I contacted him and he notes some a call from our office on Wednesday stating he did not need to come in since he is proceeding with inspire therapy.  He has met Dr. Garcia and is going to have drug-induced sleep endoscopy on February 9.  We will plan to see him back once the device is in place, he is to continue using CPAP until he sees us again.

## 2024-01-12 NOTE — TELEPHONE ENCOUNTER
Contact pt to cancel appt. Pt was refer to Dr. Garcia and he is having a dise done on 2/9/24 for inspire. Pt will be contacted after this procedure is done.

## 2024-03-28 ENCOUNTER — OFFICE VISIT (OUTPATIENT)
Dept: FAMILY MEDICINE CLINIC | Facility: CLINIC | Age: 59
End: 2024-03-28
Payer: MEDICARE

## 2024-03-28 VITALS
HEIGHT: 67 IN | SYSTOLIC BLOOD PRESSURE: 123 MMHG | DIASTOLIC BLOOD PRESSURE: 70 MMHG | TEMPERATURE: 98.4 F | OXYGEN SATURATION: 96 % | BODY MASS INDEX: 26.27 KG/M2 | HEART RATE: 75 BPM | WEIGHT: 167.38 LBS

## 2024-03-28 DIAGNOSIS — M54.12 CERVICAL RADICULAR PAIN: Primary | ICD-10-CM

## 2024-03-28 PROCEDURE — G8419 CALC BMI OUT NRM PARAM NOF/U: HCPCS | Performed by: NURSE PRACTITIONER

## 2024-03-28 PROCEDURE — 99213 OFFICE O/P EST LOW 20 MIN: CPT | Performed by: NURSE PRACTITIONER

## 2024-03-28 PROCEDURE — G8482 FLU IMMUNIZE ORDER/ADMIN: HCPCS | Performed by: NURSE PRACTITIONER

## 2024-03-28 PROCEDURE — 3017F COLORECTAL CA SCREEN DOC REV: CPT | Performed by: NURSE PRACTITIONER

## 2024-03-28 PROCEDURE — 1036F TOBACCO NON-USER: CPT | Performed by: NURSE PRACTITIONER

## 2024-03-28 PROCEDURE — G8427 DOCREV CUR MEDS BY ELIG CLIN: HCPCS | Performed by: NURSE PRACTITIONER

## 2024-03-28 RX ORDER — IBUPROFEN 800 MG/1
800 TABLET ORAL EVERY 8 HOURS PRN
COMMUNITY
Start: 2016-05-25

## 2024-03-28 RX ORDER — MELOXICAM 15 MG/1
15 TABLET ORAL DAILY
Qty: 10 TABLET | Refills: 0 | Status: SHIPPED | OUTPATIENT
Start: 2024-03-28

## 2024-03-28 RX ORDER — CYCLOBENZAPRINE HCL 10 MG
10 TABLET ORAL 3 TIMES DAILY PRN
Qty: 30 TABLET | Refills: 0 | Status: SHIPPED | OUTPATIENT
Start: 2024-03-28 | End: 2024-04-07

## 2024-03-28 ASSESSMENT — ENCOUNTER SYMPTOMS: TROUBLE SWALLOWING: 0

## 2024-03-28 ASSESSMENT — PATIENT HEALTH QUESTIONNAIRE - PHQ9
1. LITTLE INTEREST OR PLEASURE IN DOING THINGS: NOT AT ALL
SUM OF ALL RESPONSES TO PHQ9 QUESTIONS 1 & 2: 0
2. FEELING DOWN, DEPRESSED OR HOPELESS: NOT AT ALL
SUM OF ALL RESPONSES TO PHQ QUESTIONS 1-9: 0

## 2024-03-28 NOTE — PROGRESS NOTES
and side effects discussed. Has been to PT in the past, if symptoms continue, will place another referral.  Follow up with PCP if symptoms persist.         TREVOR Puentes - NP

## 2024-04-03 DIAGNOSIS — G47.33 OSA (OBSTRUCTIVE SLEEP APNEA): Primary | ICD-10-CM

## 2024-04-03 NOTE — PROGRESS NOTES
Patient is scheduled for Inspire implant on 5/20/24. He needs a current diagnostic study. HST will be ordered.Patient has agreed to do HST

## 2024-04-10 ENCOUNTER — HOSPITAL ENCOUNTER (OUTPATIENT)
Dept: SLEEP CENTER | Age: 59
Discharge: HOME | End: 2024-04-13

## 2024-04-23 ENCOUNTER — HOSPITAL ENCOUNTER (OUTPATIENT)
Dept: GENERAL RADIOLOGY | Age: 59
Discharge: HOME | End: 2024-04-26
Payer: MEDICARE

## 2024-04-23 DIAGNOSIS — M54.12 CERVICAL RADICULAR PAIN: ICD-10-CM

## 2024-04-23 PROCEDURE — 72050 X-RAY EXAM NECK SPINE 4/5VWS: CPT

## 2024-04-25 ENCOUNTER — TELEPHONE (OUTPATIENT)
Dept: FAMILY MEDICINE CLINIC | Facility: CLINIC | Age: 59
End: 2024-04-25

## 2024-04-25 NOTE — TELEPHONE ENCOUNTER
----- Message from TREVOR Puentes NP sent at 4/25/2024  1:35 PM EDT -----  Please let Mr. Galvan know his neck xray demonstrated degenerative changes.  I can place referral to ortho if he would like.    Thanks  TREVOR Puentes NP

## 2024-04-25 NOTE — TELEPHONE ENCOUNTER
Call to pt. Reviewed xray. Offered referral to Ortho, pt would like to hold off at this time. Pt encouraged to call office for referral should he change his mind.

## 2024-05-16 RX ORDER — LANSOPRAZOLE 30 MG/1
CAPSULE, DELAYED RELEASE ORAL
Qty: 90 CAPSULE | Refills: 3 | OUTPATIENT
Start: 2024-05-16

## 2024-06-10 ENCOUNTER — TELEPHONE (OUTPATIENT)
Dept: PRIMARY CARE CLINIC | Facility: CLINIC | Age: 59
End: 2024-06-10

## 2024-06-10 NOTE — TELEPHONE ENCOUNTER
Patient is having dizziness all day long especially on his right side when he lays down. He was told to do his Epley maneuver to see if that helps. If not we can try to work him in.

## 2024-06-10 NOTE — TELEPHONE ENCOUNTER
Pt was a return from nurse triage     He has been experiencing dizziness for about 2 weeks    Please call pt back

## 2024-06-19 NOTE — PROGRESS NOTES
etc.  -Continue proper sleep hygiene.          Did review [place] score, Compliance data for the BiPAP ST and activation of inspire device.        All questions are answered to the patient's satisfaction. The patient will call for further questions and concerns.    Follow up at the [facility] will be in 2 months.   Follow up will be with the patient's referring physician as had been previously scheduled.    Anibal Carson MD    Over 50% of today's office visit was spent in face to face time reviewing test results, prognosis, importance of compliance, education about disease process, benefits of medications, instructions for management of acute flare-ups, and follow up plans.       Electronically signed and dictated.  Please note if there are errors this is  likely a result of the dictation software.    No orders of the defined types were placed in this encounter.     No orders of the defined types were placed in this encounter.

## 2024-06-20 ENCOUNTER — OFFICE VISIT (OUTPATIENT)
Dept: SLEEP MEDICINE | Age: 59
End: 2024-06-20

## 2024-06-20 VITALS
RESPIRATION RATE: 14 BRPM | WEIGHT: 168 LBS | SYSTOLIC BLOOD PRESSURE: 110 MMHG | OXYGEN SATURATION: 97 % | HEIGHT: 67 IN | HEART RATE: 77 BPM | DIASTOLIC BLOOD PRESSURE: 70 MMHG | BODY MASS INDEX: 26.37 KG/M2

## 2024-06-20 DIAGNOSIS — R09.02 HYPOXEMIA: ICD-10-CM

## 2024-06-20 DIAGNOSIS — G47.33 OSA (OBSTRUCTIVE SLEEP APNEA): Primary | ICD-10-CM

## 2024-06-20 DIAGNOSIS — G47.10 HYPERSOMNIA, UNSPECIFIED: ICD-10-CM

## 2024-06-20 DIAGNOSIS — G47.00 INSOMNIA, UNSPECIFIED TYPE: ICD-10-CM

## 2024-06-20 ASSESSMENT — SLEEP AND FATIGUE QUESTIONNAIRES
HOW LIKELY ARE YOU TO NOD OFF OR FALL ASLEEP WHILE SITTING INACTIVE IN A PUBLIC PLACE: SLIGHT CHANCE OF DOZING
ESS TOTAL SCORE: 10
HOW LIKELY ARE YOU TO NOD OFF OR FALL ASLEEP WHILE SITTING QUIETLY AFTER LUNCH WITHOUT ALCOHOL: MODERATE CHANCE OF DOZING
HOW LIKELY ARE YOU TO NOD OFF OR FALL ASLEEP WHILE SITTING AND TALKING TO SOMEONE: SLIGHT CHANCE OF DOZING
HOW LIKELY ARE YOU TO NOD OFF OR FALL ASLEEP WHILE SITTING AND READING: MODERATE CHANCE OF DOZING
HOW LIKELY ARE YOU TO NOD OFF OR FALL ASLEEP IN A CAR, WHILE STOPPED FOR A FEW MINUTES IN TRAFFIC: WOULD NEVER DOZE
HOW LIKELY ARE YOU TO NOD OFF OR FALL ASLEEP WHILE WATCHING TV: MODERATE CHANCE OF DOZING
HOW LIKELY ARE YOU TO NOD OFF OR FALL ASLEEP WHEN YOU ARE A PASSENGER IN A CAR FOR AN HOUR WITHOUT A BREAK: SLIGHT CHANCE OF DOZING
HOW LIKELY ARE YOU TO NOD OFF OR FALL ASLEEP WHILE LYING DOWN TO REST IN THE AFTERNOON WHEN CIRCUMSTANCES PERMIT: SLIGHT CHANCE OF DOZING

## 2024-08-08 DIAGNOSIS — F41.1 GENERALIZED ANXIETY DISORDER: ICD-10-CM

## 2024-08-12 RX ORDER — LORAZEPAM 0.5 MG/1
0.5 TABLET ORAL EVERY 8 HOURS PRN
Qty: 30 TABLET | Refills: 0 | Status: SHIPPED | OUTPATIENT
Start: 2024-08-12 | End: 2025-02-08

## 2024-08-29 NOTE — PROGRESS NOTES
[facility]  [address]  [place] [address]  [phone]    Patient Name:  Sherif Galvan Jr.  YOB: 1965      Office Visit 9/3/2024    Chief Complaint   Patient presents with    Follow-up    Sleep Apnea    Device Check     Inspire     Inspire Implant- 5/20/24  Activation- 6/20/24    HST- 4/10/24 AHI- 16 SaO2- 83%  Sleep Study- Split 2/11/20  AHI 42.4  Sao2 76%       HISTORY OF PRESENT ILLNESS:    This pleasant gentleman came in today for a F/u regarding Inspire device.     To recap:  Mr. Galvan was originally diagnosed with severe obstructive sleep apnea in 2022 with an AHI of 47 and a low SPO2 of 76%.  He has been on BiPAP ST at 12/8 with a rate of 12.  He was using the device but had issues wih the mask and constant conflict with in resulting in insomnia.     Device implanted 5/20/24 and activated on 6/20/24.  He was complained about some irritation under his jaw and follow a ropelike area under his neck.  He also reported having funny sensation when he did be eating foods and chewing the bit.  He also reported the discomfort went to the right side of his head like a headache.  And he was under talk to Dr. Garcia    Set with the following settings:           At this time:  Current compliance data shows he has used it 28 out of 30 days old 28 days more than 4 hours average sleep is about 6 hours and 40 minutes.  It seems he went up to voltage 1.2 and then back down to a voltage of 1.1.    He is not using the BiPAP ST with it.    He is reporting that he is having headaches on a daily basis now since he has been using this device.  He indicates that so bad that he takes between 4-6 ibuprofens per day just to help with it.  He indicates that his tongue is sore and the right side of his face gets contorted at nighttime.  His wife has told him he is drooling and his mouth is moving towards the right.  He also reports he is having discomfort at the site of the

## 2024-09-03 ENCOUNTER — OFFICE VISIT (OUTPATIENT)
Dept: SLEEP MEDICINE | Age: 59
End: 2024-09-03
Payer: MEDICARE

## 2024-09-03 VITALS
HEIGHT: 67 IN | HEART RATE: 70 BPM | OXYGEN SATURATION: 97 % | SYSTOLIC BLOOD PRESSURE: 136 MMHG | RESPIRATION RATE: 14 BRPM | BODY MASS INDEX: 2.51 KG/M2 | DIASTOLIC BLOOD PRESSURE: 90 MMHG | WEIGHT: 16 LBS

## 2024-09-03 DIAGNOSIS — Z91.89 AT RISK FOR COMPLICATION AT SITE OF PACEMAKER: ICD-10-CM

## 2024-09-03 DIAGNOSIS — G47.33 OSA (OBSTRUCTIVE SLEEP APNEA): Primary | ICD-10-CM

## 2024-09-03 DIAGNOSIS — G47.00 INSOMNIA, UNSPECIFIED TYPE: ICD-10-CM

## 2024-09-03 DIAGNOSIS — R09.02 HYPOXEMIA: ICD-10-CM

## 2024-09-03 DIAGNOSIS — G47.10 HYPERSOMNIA, UNSPECIFIED: ICD-10-CM

## 2024-09-03 PROCEDURE — 99215 OFFICE O/P EST HI 40 MIN: CPT | Performed by: INTERNAL MEDICINE

## 2024-09-03 PROCEDURE — 1036F TOBACCO NON-USER: CPT | Performed by: INTERNAL MEDICINE

## 2024-09-03 PROCEDURE — 95977 ALYS CPLX CN NPGT PRGRMG: CPT | Performed by: INTERNAL MEDICINE

## 2024-09-03 PROCEDURE — G8419 CALC BMI OUT NRM PARAM NOF/U: HCPCS | Performed by: INTERNAL MEDICINE

## 2024-09-03 PROCEDURE — 3017F COLORECTAL CA SCREEN DOC REV: CPT | Performed by: INTERNAL MEDICINE

## 2024-09-03 PROCEDURE — G8427 DOCREV CUR MEDS BY ELIG CLIN: HCPCS | Performed by: INTERNAL MEDICINE

## 2024-09-03 ASSESSMENT — SLEEP AND FATIGUE QUESTIONNAIRES
HOW LIKELY ARE YOU TO NOD OFF OR FALL ASLEEP WHILE SITTING AND READING: SLIGHT CHANCE OF DOZING
ESS TOTAL SCORE: 9
HOW LIKELY ARE YOU TO NOD OFF OR FALL ASLEEP WHILE SITTING QUIETLY AFTER LUNCH WITHOUT ALCOHOL: MODERATE CHANCE OF DOZING
HOW LIKELY ARE YOU TO NOD OFF OR FALL ASLEEP IN A CAR, WHILE STOPPED FOR A FEW MINUTES IN TRAFFIC: WOULD NEVER DOZE
HOW LIKELY ARE YOU TO NOD OFF OR FALL ASLEEP WHILE WATCHING TV: MODERATE CHANCE OF DOZING
HOW LIKELY ARE YOU TO NOD OFF OR FALL ASLEEP WHEN YOU ARE A PASSENGER IN A CAR FOR AN HOUR WITHOUT A BREAK: SLIGHT CHANCE OF DOZING
HOW LIKELY ARE YOU TO NOD OFF OR FALL ASLEEP WHILE LYING DOWN TO REST IN THE AFTERNOON WHEN CIRCUMSTANCES PERMIT: MODERATE CHANCE OF DOZING
HOW LIKELY ARE YOU TO NOD OFF OR FALL ASLEEP WHILE SITTING AND TALKING TO SOMEONE: SLIGHT CHANCE OF DOZING
HOW LIKELY ARE YOU TO NOD OFF OR FALL ASLEEP WHILE SITTING INACTIVE IN A PUBLIC PLACE: WOULD NEVER DOZE

## 2024-09-17 ENCOUNTER — OFFICE VISIT (OUTPATIENT)
Dept: PRIMARY CARE CLINIC | Facility: CLINIC | Age: 59
End: 2024-09-17
Payer: MEDICARE

## 2024-09-17 VITALS
OXYGEN SATURATION: 98 % | HEART RATE: 87 BPM | DIASTOLIC BLOOD PRESSURE: 81 MMHG | WEIGHT: 169 LBS | SYSTOLIC BLOOD PRESSURE: 136 MMHG | HEIGHT: 67 IN | BODY MASS INDEX: 26.53 KG/M2 | TEMPERATURE: 98.1 F

## 2024-09-17 DIAGNOSIS — K11.8 PAROTID DUCT OBSTRUCTION: Primary | ICD-10-CM

## 2024-09-17 DIAGNOSIS — E78.2 MIXED HYPERLIPIDEMIA: ICD-10-CM

## 2024-09-17 DIAGNOSIS — E55.9 VITAMIN D DEFICIENCY, UNSPECIFIED: ICD-10-CM

## 2024-09-17 LAB
25(OH)D3 SERPL-MCNC: 26.4 NG/ML (ref 30–100)
ALBUMIN SERPL-MCNC: 4.4 G/DL (ref 3.2–4.6)
ALBUMIN/GLOB SERPL: 1.5 (ref 1–1.9)
ALP SERPL-CCNC: 81 U/L (ref 40–129)
ALT SERPL-CCNC: 18 U/L (ref 12–65)
ANION GAP SERPL CALC-SCNC: 12 MMOL/L (ref 9–18)
AST SERPL-CCNC: 15 U/L (ref 15–37)
BASOPHILS # BLD: 0 K/UL (ref 0–0.2)
BASOPHILS NFR BLD: 0 % (ref 0–2)
BILIRUB SERPL-MCNC: 0.3 MG/DL (ref 0–1.2)
BUN SERPL-MCNC: 14 MG/DL (ref 8–23)
CALCIUM SERPL-MCNC: 10 MG/DL (ref 8.8–10.2)
CHLORIDE SERPL-SCNC: 104 MMOL/L (ref 98–107)
CO2 SERPL-SCNC: 21 MMOL/L (ref 20–28)
CREAT SERPL-MCNC: 0.84 MG/DL (ref 0.8–1.3)
DIFFERENTIAL METHOD BLD: ABNORMAL
EOSINOPHIL # BLD: 0 K/UL (ref 0–0.8)
EOSINOPHIL NFR BLD: 0 % (ref 0.5–7.8)
ERYTHROCYTE [DISTWIDTH] IN BLOOD BY AUTOMATED COUNT: 13.2 % (ref 11.9–14.6)
GLOBULIN SER CALC-MCNC: 2.9 G/DL (ref 2.3–3.5)
GLUCOSE SERPL-MCNC: 133 MG/DL (ref 70–99)
HCT VFR BLD AUTO: 42.2 % (ref 41.1–50.3)
HGB BLD-MCNC: 13.4 G/DL (ref 13.6–17.2)
IMM GRANULOCYTES # BLD AUTO: 0.1 K/UL (ref 0–0.5)
IMM GRANULOCYTES NFR BLD AUTO: 1 % (ref 0–5)
LYMPHOCYTES # BLD: 1 K/UL (ref 0.5–4.6)
LYMPHOCYTES NFR BLD: 7 % (ref 13–44)
MCH RBC QN AUTO: 28.8 PG (ref 26.1–32.9)
MCHC RBC AUTO-ENTMCNC: 31.8 G/DL (ref 31.4–35)
MCV RBC AUTO: 90.8 FL (ref 82–102)
MONOCYTES # BLD: 0.9 K/UL (ref 0.1–1.3)
MONOCYTES NFR BLD: 6 % (ref 4–12)
NEUTS SEG # BLD: 13.1 K/UL (ref 1.7–8.2)
NEUTS SEG NFR BLD: 86 % (ref 43–78)
NRBC # BLD: 0 K/UL (ref 0–0.2)
PLATELET # BLD AUTO: 338 K/UL (ref 150–450)
PMV BLD AUTO: 10.2 FL (ref 9.4–12.3)
POTASSIUM SERPL-SCNC: 4.4 MMOL/L (ref 3.5–5.1)
PROT SERPL-MCNC: 7.3 G/DL (ref 6.3–8.2)
RBC # BLD AUTO: 4.65 M/UL (ref 4.23–5.6)
SODIUM SERPL-SCNC: 137 MMOL/L (ref 136–145)
WBC # BLD AUTO: 15 K/UL (ref 4.3–11.1)

## 2024-09-17 PROCEDURE — 1036F TOBACCO NON-USER: CPT | Performed by: FAMILY MEDICINE

## 2024-09-17 PROCEDURE — 99214 OFFICE O/P EST MOD 30 MIN: CPT | Performed by: FAMILY MEDICINE

## 2024-09-17 PROCEDURE — 3017F COLORECTAL CA SCREEN DOC REV: CPT | Performed by: FAMILY MEDICINE

## 2024-09-17 PROCEDURE — G8427 DOCREV CUR MEDS BY ELIG CLIN: HCPCS | Performed by: FAMILY MEDICINE

## 2024-09-17 PROCEDURE — G8419 CALC BMI OUT NRM PARAM NOF/U: HCPCS | Performed by: FAMILY MEDICINE

## 2024-09-17 RX ORDER — PREDNISONE 20 MG/1
30 TABLET ORAL DAILY
COMMUNITY
Start: 2024-09-16 | End: 2024-09-21

## 2024-09-17 RX ORDER — ERGOCALCIFEROL 1.25 MG/1
50000 CAPSULE ORAL WEEKLY
Qty: 4 CAPSULE | Refills: 5 | Status: SHIPPED | OUTPATIENT
Start: 2024-09-17

## 2024-09-17 SDOH — ECONOMIC STABILITY: FOOD INSECURITY: WITHIN THE PAST 12 MONTHS, THE FOOD YOU BOUGHT JUST DIDN'T LAST AND YOU DIDN'T HAVE MONEY TO GET MORE.: NEVER TRUE

## 2024-09-17 SDOH — ECONOMIC STABILITY: INCOME INSECURITY: HOW HARD IS IT FOR YOU TO PAY FOR THE VERY BASICS LIKE FOOD, HOUSING, MEDICAL CARE, AND HEATING?: SOMEWHAT HARD

## 2024-09-17 SDOH — ECONOMIC STABILITY: FOOD INSECURITY: WITHIN THE PAST 12 MONTHS, YOU WORRIED THAT YOUR FOOD WOULD RUN OUT BEFORE YOU GOT MONEY TO BUY MORE.: NEVER TRUE

## 2024-09-17 ASSESSMENT — PATIENT HEALTH QUESTIONNAIRE - PHQ9
SUM OF ALL RESPONSES TO PHQ QUESTIONS 1-9: 0
1. LITTLE INTEREST OR PLEASURE IN DOING THINGS: NOT AT ALL
SUM OF ALL RESPONSES TO PHQ QUESTIONS 1-9: 0
2. FEELING DOWN, DEPRESSED OR HOPELESS: NOT AT ALL
SUM OF ALL RESPONSES TO PHQ QUESTIONS 1-9: 0
SUM OF ALL RESPONSES TO PHQ9 QUESTIONS 1 & 2: 0
SUM OF ALL RESPONSES TO PHQ QUESTIONS 1-9: 0

## 2024-10-03 DIAGNOSIS — Z79.899 ENCOUNTER FOR LONG-TERM CURRENT USE OF MEDICATION: ICD-10-CM

## 2024-10-03 DIAGNOSIS — Z13.6 SCREENING FOR CARDIOVASCULAR CONDITION: ICD-10-CM

## 2024-10-04 LAB
ALBUMIN SERPL-MCNC: 4.2 G/DL (ref 3.2–4.6)
ALBUMIN/GLOB SERPL: 1.5 (ref 1–1.9)
ALP SERPL-CCNC: 78 U/L (ref 40–129)
ALT SERPL-CCNC: 14 U/L (ref 8–55)
ANION GAP SERPL CALC-SCNC: 14 MMOL/L (ref 9–18)
AST SERPL-CCNC: 19 U/L (ref 15–37)
BASOPHILS # BLD: 0.1 K/UL (ref 0–0.2)
BASOPHILS NFR BLD: 1 % (ref 0–2)
BILIRUB SERPL-MCNC: 0.4 MG/DL (ref 0–1.2)
BUN SERPL-MCNC: 9 MG/DL (ref 8–23)
CALCIUM SERPL-MCNC: 9.1 MG/DL (ref 8.8–10.2)
CHLORIDE SERPL-SCNC: 103 MMOL/L (ref 98–107)
CHOLEST SERPL-MCNC: 200 MG/DL (ref 0–200)
CO2 SERPL-SCNC: 22 MMOL/L (ref 20–28)
CREAT SERPL-MCNC: 0.99 MG/DL (ref 0.8–1.3)
DIFFERENTIAL METHOD BLD: ABNORMAL
EOSINOPHIL # BLD: 0.2 K/UL (ref 0–0.8)
EOSINOPHIL NFR BLD: 2 % (ref 0.5–7.8)
ERYTHROCYTE [DISTWIDTH] IN BLOOD BY AUTOMATED COUNT: 13.3 % (ref 11.9–14.6)
GLOBULIN SER CALC-MCNC: 2.8 G/DL (ref 2.3–3.5)
GLUCOSE SERPL-MCNC: 82 MG/DL (ref 70–99)
HCT VFR BLD AUTO: 39.5 % (ref 41.1–50.3)
HDLC SERPL-MCNC: 90 MG/DL (ref 40–60)
HDLC SERPL: 2.2 (ref 0–5)
HGB BLD-MCNC: 12.7 G/DL (ref 13.6–17.2)
IMM GRANULOCYTES # BLD AUTO: 0.1 K/UL (ref 0–0.5)
IMM GRANULOCYTES NFR BLD AUTO: 1 % (ref 0–5)
LDLC SERPL CALC-MCNC: 95 MG/DL (ref 0–100)
LYMPHOCYTES # BLD: 2.8 K/UL (ref 0.5–4.6)
LYMPHOCYTES NFR BLD: 32 % (ref 13–44)
MCH RBC QN AUTO: 29.5 PG (ref 26.1–32.9)
MCHC RBC AUTO-ENTMCNC: 32.2 G/DL (ref 31.4–35)
MCV RBC AUTO: 91.9 FL (ref 82–102)
MONOCYTES # BLD: 0.7 K/UL (ref 0.1–1.3)
MONOCYTES NFR BLD: 8 % (ref 4–12)
NEUTS SEG # BLD: 4.8 K/UL (ref 1.7–8.2)
NEUTS SEG NFR BLD: 56 % (ref 43–78)
NRBC # BLD: 0 K/UL (ref 0–0.2)
PLATELET # BLD AUTO: 288 K/UL (ref 150–450)
PMV BLD AUTO: 10.2 FL (ref 9.4–12.3)
POTASSIUM SERPL-SCNC: 3.9 MMOL/L (ref 3.5–5.1)
PROT SERPL-MCNC: 7 G/DL (ref 6.3–8.2)
RBC # BLD AUTO: 4.3 M/UL (ref 4.23–5.6)
SODIUM SERPL-SCNC: 139 MMOL/L (ref 136–145)
TRIGL SERPL-MCNC: 77 MG/DL (ref 0–150)
VLDLC SERPL CALC-MCNC: 15 MG/DL (ref 6–23)
WBC # BLD AUTO: 8.7 K/UL (ref 4.3–11.1)

## 2024-10-08 ENCOUNTER — OFFICE VISIT (OUTPATIENT)
Dept: PRIMARY CARE CLINIC | Facility: CLINIC | Age: 59
End: 2024-10-08
Payer: MEDICARE

## 2024-10-08 VITALS
TEMPERATURE: 97.3 F | HEART RATE: 71 BPM | WEIGHT: 163 LBS | SYSTOLIC BLOOD PRESSURE: 110 MMHG | OXYGEN SATURATION: 98 % | BODY MASS INDEX: 25.58 KG/M2 | HEIGHT: 67 IN | DIASTOLIC BLOOD PRESSURE: 59 MMHG

## 2024-10-08 DIAGNOSIS — K04.7 TOOTH ABSCESS: Primary | ICD-10-CM

## 2024-10-08 PROCEDURE — G8419 CALC BMI OUT NRM PARAM NOF/U: HCPCS | Performed by: FAMILY MEDICINE

## 2024-10-08 PROCEDURE — 99213 OFFICE O/P EST LOW 20 MIN: CPT | Performed by: FAMILY MEDICINE

## 2024-10-08 PROCEDURE — G8484 FLU IMMUNIZE NO ADMIN: HCPCS | Performed by: FAMILY MEDICINE

## 2024-10-08 PROCEDURE — 1036F TOBACCO NON-USER: CPT | Performed by: FAMILY MEDICINE

## 2024-10-08 PROCEDURE — G8427 DOCREV CUR MEDS BY ELIG CLIN: HCPCS | Performed by: FAMILY MEDICINE

## 2024-10-08 PROCEDURE — 3017F COLORECTAL CA SCREEN DOC REV: CPT | Performed by: FAMILY MEDICINE

## 2024-10-08 RX ORDER — DOXYCYCLINE HYCLATE 100 MG
100 TABLET ORAL 2 TIMES DAILY
Qty: 20 TABLET | Refills: 0 | Status: SHIPPED | OUTPATIENT
Start: 2024-10-08 | End: 2024-10-18

## 2024-10-08 ASSESSMENT — PATIENT HEALTH QUESTIONNAIRE - PHQ9
SUM OF ALL RESPONSES TO PHQ QUESTIONS 1-9: 0
SUM OF ALL RESPONSES TO PHQ QUESTIONS 1-9: 0
1. LITTLE INTEREST OR PLEASURE IN DOING THINGS: NOT AT ALL
SUM OF ALL RESPONSES TO PHQ9 QUESTIONS 1 & 2: 0
2. FEELING DOWN, DEPRESSED OR HOPELESS: NOT AT ALL
SUM OF ALL RESPONSES TO PHQ QUESTIONS 1-9: 0
SUM OF ALL RESPONSES TO PHQ QUESTIONS 1-9: 0

## 2024-10-08 NOTE — PROGRESS NOTES
800 MG tablet Take 1 tablet by mouth every 8 hours as needed for Pain      tamsulosin (FLOMAX) 0.4 MG capsule TAKE 1 CAPSULE BY MOUTH EVERY DAY 90 capsule 3    lansoprazole (PREVACID) 30 MG delayed release capsule TAKE 1 CAPSULE EVERY DAY BEFORE BREAKFAST 90 capsule 1    meloxicam (MOBIC) 15 MG tablet Take 1 tablet by mouth daily (Patient not taking: Reported on 9/17/2024) 10 tablet 0    CPAP Machine MISC by Does not apply route (Patient not taking: Reported on 9/17/2024)       No current facility-administered medications for this visit.        Patient Active Problem List   Diagnosis    Chronic pain in shoulder    Cervical radicular pain    DUDLEY (obstructive sleep apnea)    Benign prostatic hyperplasia with nocturia    Allergic rhinitis due to pollen    Gastroesophageal reflux disease with esophagitis without hemorrhage    Hyperlipidemia    Hypersomnia    Nocturnal hypoxemia    Insomnia with sleep apnea    History of benign laryngeal tumor       Family History   Problem Relation Age of Onset    Diabetes Father         po meds    Hypertension Father     Psychiatric Disorder Mother     Heart Disease Mother         no MI- 1 stent    No Known Problems Sister     Cancer Paternal Grandfather         prostate        Past Surgical History:   Procedure Laterality Date    CARPAL TUNNEL RELEASE Bilateral     2 separate surgeries    CARPAL TUNNEL RELEASE Right     COLONOSCOPY  11/30/2021    No Polyps     COLONOSCOPY  09/2017    Polyps    COLONOSCOPY N/A 9/21/2016    [facility]--no polyps, random biopsies negative--10 year recall    HEENT  last 1/2004    vocal cord poyp removed multiple    HERNIA REPAIR Right     OTHER SURGICAL HISTORY      Inspire    TRACHEOSTOMY          Review of Systems     Vitals:    10/08/24 1538   BP: (!) 110/59   Pulse: 71   Temp: 97.3 °F (36.3 °C)   SpO2: 98%        Physical Exam  HENT:      Head: Normocephalic.      Right Ear: Tympanic membrane, ear canal and external ear normal.      Left Ear: Tympanic

## 2024-10-14 ENCOUNTER — TELEMEDICINE (OUTPATIENT)
Dept: PRIMARY CARE CLINIC | Facility: CLINIC | Age: 59
End: 2024-10-14
Payer: MEDICARE

## 2024-10-14 DIAGNOSIS — K04.7 DENTAL ABSCESS: Primary | ICD-10-CM

## 2024-10-14 PROCEDURE — 99213 OFFICE O/P EST LOW 20 MIN: CPT | Performed by: NURSE PRACTITIONER

## 2024-10-14 RX ORDER — LIDOCAINE HYDROCHLORIDE 20 MG/ML
5 SOLUTION OROPHARYNGEAL PRN
Qty: 200 ML | Refills: 0 | Status: SHIPPED | OUTPATIENT
Start: 2024-10-14 | End: 2024-10-24

## 2024-10-14 ASSESSMENT — ENCOUNTER SYMPTOMS
ABDOMINAL PAIN: 0
ABDOMINAL DISTENTION: 0
RESPIRATORY NEGATIVE: 1
EYES NEGATIVE: 1
ALLERGIC/IMMUNOLOGIC NEGATIVE: 1

## 2024-10-14 NOTE — PROGRESS NOTES
neck        [] Abnormal-       Neurological:        [x] No Facial Asymmetry (Cranial nerve 7 motor function) (limited exam to video visit)          [x] No gaze palsy        [] Abnormal-         Skin:        [x] No significant exanthematous lesions or discoloration noted on facial skin         [] Abnormal-            Psychiatric:       [x] Normal Affect [] No Hallucinations        [] Abnormal-     Other pertinent observable physical exam findings-     ASSESSMENT/PLAN:    1. Tooth Abscess Management  - Patient reports a toothache that started last Tuesday, located in the lower right molar area. Currently on doxycycline, but not experiencing relief.  - Plan:    a. Discontinue doxycycline.    b. Prescribe Amoxicillin for the tooth abscess.    c. Instruct the patient to notify the provider if no improvement is seen within 48 hours.    d. Encourage warm saltwater rinses and gargles to help alleviate discomfort.    e. Schedule a follow-up appointment with the dentist.    2. Pain Management  - Patient reports significant pain in the affected tooth area, unable to pinpoint the exact tooth due to the severity of the pain.  - Plan:    a. Prescribe Lidocaine for topical pain relief.    b. Instruct the patient on proper application and usage.    c. Monitor pain levels and adjust treatment as needed.    3. Dental Follow-up  - Patient has a dental appointment scheduled later this month.  - Plan:    a. Encourage the patient to keep the dental appointment and discuss the tooth abscess and any further treatment options with the dentist.    b. Patient mentioned the appointment is for a 6-month check-up, but they could potentially get an earlier appointment if needed.    1. Dental abscess  -     amoxicillin-clavulanate (AUGMENTIN) 875-125 MG per tablet; Take 1 tablet by mouth 2 times daily for 10 days, Disp-20 tablet, R-0Normal  -     lidocaine viscous hcl (XYLOCAINE) 2 % SOLN solution; Take 5 mLs by mouth as needed for Dental Pain,

## 2024-10-24 ENCOUNTER — TELEPHONE (OUTPATIENT)
Dept: SLEEP MEDICINE | Age: 59
End: 2024-10-24

## 2024-10-24 NOTE — TELEPHONE ENCOUNTER
Called patient to let him know results were normal per Dr. Carson. Patient verbalized understanding, no further questions or concerns at this time.

## 2024-10-24 NOTE — TELEPHONE ENCOUNTER
----- Message from Dr. Anibal Carson MD sent at 10/23/2024  4:47 PM EDT -----  The results for the overnight are normal.  Jose can you please let the patient know.  Anibal Carson MD

## 2024-11-04 NOTE — PROGRESS NOTES
[facility]  [address]  [place] [address]  [phone]    Patient Name:  Sherif Galvan Jr.  YOB: 1965      Office Visit 11/5/2024    Chief Complaint   Patient presents with    Follow-up    Sleep Apnea    Device Check     Inspire     Inspire Implant- 5/20/24  Activation- 6/20/24    HST- 4/10/24 AHI- 16 SaO2- 83%  Sleep Study- Split 2/11/20  AHI 42.4  Sao2 76%       HISTORY OF PRESENT ILLNESS:    This pleasant gentleman came in today for a F/u regarding Inspire device.     To recap:  Mr. Galvan was originally diagnosed with severe obstructive sleep apnea in 2022 with an AHI of 47 and a low SPO2 of 76%.  He has been on BiPAP ST at 12/8 with a rate of 12.  He was using the device but had issues wih the mask and constant conflict with in resulting in insomnia.     Device implanted 5/20/24 and activated on 6/20/24.  Device adjusted last time due to issues with mouth discomfort on the Right near his angle of the jaw. Had Headaches as well last time. No problems eating, drinking, chewing.     Settings were as follows:           At this time:  Using device every noted and currently 26/30 days more than 4 hours. Average is 6 hours per night. On a Voltage of 2.5. Did go up but uncomfortable on Right jaw. Overall headaches are less now about 3-4 x per week vs daily. Better with meds and after a few hours. No history of migraines, etcs.     Chest site discomfort is less now - mild to none as well.     Did get overnight and noted no nocturnal hypoxemia -- see below.     [place] score today is 8/24 11/5/2024     9:05 AM 9/3/2024     8:08 AM 6/20/2024    10:48 AM 10/17/2023     8:49 AM 6/30/2023     1:09 PM 1/13/2023     3:51 PM   Sleep Medicine   Sitting and reading 0 1 2 3 0 3   Watching TV 2 2 2 3 1 3   Sitting, inactive in a public place (e.g. a theatre or a meeting) 2 0 1 2 1 3   As a passenger in a car for an hour without a break 1 1 1 3 1 2

## 2024-11-05 ENCOUNTER — OFFICE VISIT (OUTPATIENT)
Dept: SLEEP MEDICINE | Age: 59
End: 2024-11-05
Payer: MEDICARE

## 2024-11-05 VITALS
SYSTOLIC BLOOD PRESSURE: 140 MMHG | OXYGEN SATURATION: 98 % | HEART RATE: 81 BPM | WEIGHT: 163 LBS | BODY MASS INDEX: 25.58 KG/M2 | HEIGHT: 67 IN | RESPIRATION RATE: 14 BRPM | DIASTOLIC BLOOD PRESSURE: 84 MMHG

## 2024-11-05 DIAGNOSIS — G47.30 INSOMNIA WITH SLEEP APNEA: ICD-10-CM

## 2024-11-05 DIAGNOSIS — G47.00 INSOMNIA, UNSPECIFIED TYPE: ICD-10-CM

## 2024-11-05 DIAGNOSIS — K14.6 SORENESS OF TONGUE: ICD-10-CM

## 2024-11-05 DIAGNOSIS — G47.33 OSA (OBSTRUCTIVE SLEEP APNEA): Primary | ICD-10-CM

## 2024-11-05 DIAGNOSIS — G47.10 HYPERSOMNIA, UNSPECIFIED: ICD-10-CM

## 2024-11-05 DIAGNOSIS — R09.02 HYPOXEMIA: ICD-10-CM

## 2024-11-05 DIAGNOSIS — G47.00 INSOMNIA WITH SLEEP APNEA: ICD-10-CM

## 2024-11-05 PROCEDURE — G8484 FLU IMMUNIZE NO ADMIN: HCPCS | Performed by: INTERNAL MEDICINE

## 2024-11-05 PROCEDURE — 99215 OFFICE O/P EST HI 40 MIN: CPT | Performed by: INTERNAL MEDICINE

## 2024-11-05 PROCEDURE — 3017F COLORECTAL CA SCREEN DOC REV: CPT | Performed by: INTERNAL MEDICINE

## 2024-11-05 PROCEDURE — 95977 ALYS CPLX CN NPGT PRGRMG: CPT | Performed by: INTERNAL MEDICINE

## 2024-11-05 PROCEDURE — G8419 CALC BMI OUT NRM PARAM NOF/U: HCPCS | Performed by: INTERNAL MEDICINE

## 2024-11-05 PROCEDURE — G8427 DOCREV CUR MEDS BY ELIG CLIN: HCPCS | Performed by: INTERNAL MEDICINE

## 2024-11-05 PROCEDURE — 1036F TOBACCO NON-USER: CPT | Performed by: INTERNAL MEDICINE

## 2024-11-05 RX ORDER — CYCLOBENZAPRINE HCL 5 MG
5 TABLET ORAL NIGHTLY PRN
Qty: 10 TABLET | Refills: 0 | Status: SHIPPED | OUTPATIENT
Start: 2024-11-05 | End: 2024-11-15

## 2024-11-05 ASSESSMENT — SLEEP AND FATIGUE QUESTIONNAIRES
HOW LIKELY ARE YOU TO NOD OFF OR FALL ASLEEP IN A CAR, WHILE STOPPED FOR A FEW MINUTES IN TRAFFIC: WOULD NEVER DOZE
HOW LIKELY ARE YOU TO NOD OFF OR FALL ASLEEP WHEN YOU ARE A PASSENGER IN A CAR FOR AN HOUR WITHOUT A BREAK: SLIGHT CHANCE OF DOZING
HOW LIKELY ARE YOU TO NOD OFF OR FALL ASLEEP WHILE SITTING INACTIVE IN A PUBLIC PLACE: MODERATE CHANCE OF DOZING
HOW LIKELY ARE YOU TO NOD OFF OR FALL ASLEEP WHILE LYING DOWN TO REST IN THE AFTERNOON WHEN CIRCUMSTANCES PERMIT: MODERATE CHANCE OF DOZING
HOW LIKELY ARE YOU TO NOD OFF OR FALL ASLEEP WHILE SITTING AND READING: WOULD NEVER DOZE
HOW LIKELY ARE YOU TO NOD OFF OR FALL ASLEEP WHILE WATCHING TV: MODERATE CHANCE OF DOZING
HOW LIKELY ARE YOU TO NOD OFF OR FALL ASLEEP WHILE SITTING QUIETLY AFTER LUNCH WITHOUT ALCOHOL: SLIGHT CHANCE OF DOZING
ESS TOTAL SCORE: 8
HOW LIKELY ARE YOU TO NOD OFF OR FALL ASLEEP WHILE SITTING AND TALKING TO SOMEONE: WOULD NEVER DOZE

## 2024-11-12 DIAGNOSIS — Z12.5 SCREENING FOR MALIGNANT NEOPLASM OF PROSTATE: ICD-10-CM

## 2024-11-12 DIAGNOSIS — E55.9 VITAMIN D DEFICIENCY, UNSPECIFIED: ICD-10-CM

## 2024-11-12 DIAGNOSIS — E78.2 MIXED HYPERLIPIDEMIA: ICD-10-CM

## 2024-11-12 DIAGNOSIS — E78.2 MIXED HYPERLIPIDEMIA: Primary | ICD-10-CM

## 2024-11-12 LAB
25(OH)D3 SERPL-MCNC: 40.7 NG/ML (ref 30–100)
ALBUMIN SERPL-MCNC: 4.3 G/DL (ref 3.2–4.6)
ALBUMIN/GLOB SERPL: 1.5 (ref 1–1.9)
ALP SERPL-CCNC: 81 U/L (ref 40–129)
ALT SERPL-CCNC: 16 U/L (ref 8–55)
ANION GAP SERPL CALC-SCNC: 12 MMOL/L (ref 7–16)
AST SERPL-CCNC: 18 U/L (ref 15–37)
BASOPHILS # BLD: 0.1 K/UL (ref 0–0.2)
BASOPHILS NFR BLD: 1 % (ref 0–2)
BILIRUB SERPL-MCNC: 0.5 MG/DL (ref 0–1.2)
BUN SERPL-MCNC: 18 MG/DL (ref 8–23)
CALCIUM SERPL-MCNC: 9.6 MG/DL (ref 8.8–10.2)
CHLORIDE SERPL-SCNC: 102 MMOL/L (ref 98–107)
CHOLEST SERPL-MCNC: 217 MG/DL (ref 0–200)
CO2 SERPL-SCNC: 24 MMOL/L (ref 20–29)
CREAT SERPL-MCNC: 0.99 MG/DL (ref 0.8–1.3)
DIFFERENTIAL METHOD BLD: NORMAL
EOSINOPHIL # BLD: 0.3 K/UL (ref 0–0.8)
EOSINOPHIL NFR BLD: 5 % (ref 0.5–7.8)
ERYTHROCYTE [DISTWIDTH] IN BLOOD BY AUTOMATED COUNT: 13.5 % (ref 11.9–14.6)
GLOBULIN SER CALC-MCNC: 2.8 G/DL (ref 2.3–3.5)
GLUCOSE SERPL-MCNC: 87 MG/DL (ref 70–99)
HCT VFR BLD AUTO: 44.2 % (ref 41.1–50.3)
HDLC SERPL-MCNC: 86 MG/DL (ref 40–60)
HDLC SERPL: 2.5 (ref 0–5)
HGB BLD-MCNC: 13.9 G/DL (ref 13.6–17.2)
IMM GRANULOCYTES # BLD AUTO: 0 K/UL (ref 0–0.5)
IMM GRANULOCYTES NFR BLD AUTO: 0 % (ref 0–5)
LDLC SERPL CALC-MCNC: 119 MG/DL (ref 0–100)
LYMPHOCYTES # BLD: 2.1 K/UL (ref 0.5–4.6)
LYMPHOCYTES NFR BLD: 30 % (ref 13–44)
MCH RBC QN AUTO: 29 PG (ref 26.1–32.9)
MCHC RBC AUTO-ENTMCNC: 31.4 G/DL (ref 31.4–35)
MCV RBC AUTO: 92.3 FL (ref 82–102)
MONOCYTES # BLD: 0.7 K/UL (ref 0.1–1.3)
MONOCYTES NFR BLD: 10 % (ref 4–12)
NEUTS SEG # BLD: 3.8 K/UL (ref 1.7–8.2)
NEUTS SEG NFR BLD: 54 % (ref 43–78)
NRBC # BLD: 0 K/UL (ref 0–0.2)
PLATELET # BLD AUTO: 321 K/UL (ref 150–450)
PMV BLD AUTO: 9.7 FL (ref 9.4–12.3)
POTASSIUM SERPL-SCNC: 4.6 MMOL/L (ref 3.5–5.1)
PROT SERPL-MCNC: 7.1 G/DL (ref 6.3–8.2)
PSA SERPL-MCNC: 0.9 NG/ML (ref 0–4)
RBC # BLD AUTO: 4.79 M/UL (ref 4.23–5.6)
SODIUM SERPL-SCNC: 138 MMOL/L (ref 136–145)
TRIGL SERPL-MCNC: 63 MG/DL (ref 0–150)
VLDLC SERPL CALC-MCNC: 13 MG/DL (ref 6–23)
WBC # BLD AUTO: 7 K/UL (ref 4.3–11.1)

## 2024-12-02 ENCOUNTER — OFFICE VISIT (OUTPATIENT)
Dept: PRIMARY CARE CLINIC | Facility: CLINIC | Age: 59
End: 2024-12-02

## 2024-12-02 VITALS
HEIGHT: 67 IN | HEART RATE: 74 BPM | BODY MASS INDEX: 25.58 KG/M2 | WEIGHT: 163 LBS | OXYGEN SATURATION: 99 % | TEMPERATURE: 97.2 F | SYSTOLIC BLOOD PRESSURE: 124 MMHG | DIASTOLIC BLOOD PRESSURE: 76 MMHG

## 2024-12-02 DIAGNOSIS — F41.1 GENERALIZED ANXIETY DISORDER: ICD-10-CM

## 2024-12-02 DIAGNOSIS — G47.33 OSA (OBSTRUCTIVE SLEEP APNEA): ICD-10-CM

## 2024-12-02 DIAGNOSIS — E78.2 MIXED HYPERLIPIDEMIA: ICD-10-CM

## 2024-12-02 DIAGNOSIS — Z00.00 MEDICARE ANNUAL WELLNESS VISIT, SUBSEQUENT: Primary | ICD-10-CM

## 2024-12-02 DIAGNOSIS — M54.12 RADICULOPATHY, CERVICAL REGION: ICD-10-CM

## 2024-12-02 RX ORDER — ERGOCALCIFEROL 1.25 MG/1
50000 CAPSULE ORAL WEEKLY
Qty: 4 CAPSULE | Refills: 5 | Status: SHIPPED | OUTPATIENT
Start: 2024-12-02

## 2024-12-02 RX ORDER — LORAZEPAM 0.5 MG/1
0.5 TABLET ORAL EVERY 8 HOURS PRN
Qty: 30 TABLET | Refills: 0 | Status: SHIPPED | OUTPATIENT
Start: 2024-12-02 | End: 2025-05-31

## 2024-12-02 RX ORDER — CARISOPRODOL 350 MG/1
350 TABLET ORAL DAILY PRN
Qty: 30 TABLET | Refills: 2 | Status: SHIPPED | OUTPATIENT
Start: 2024-12-02 | End: 2025-03-02

## 2024-12-02 ASSESSMENT — PATIENT HEALTH QUESTIONNAIRE - PHQ9
SUM OF ALL RESPONSES TO PHQ QUESTIONS 1-9: 0
2. FEELING DOWN, DEPRESSED OR HOPELESS: NOT AT ALL
SUM OF ALL RESPONSES TO PHQ QUESTIONS 1-9: 0
SUM OF ALL RESPONSES TO PHQ QUESTIONS 1-9: 0
1. LITTLE INTEREST OR PLEASURE IN DOING THINGS: NOT AT ALL
SUM OF ALL RESPONSES TO PHQ9 QUESTIONS 1 & 2: 0
SUM OF ALL RESPONSES TO PHQ QUESTIONS 1-9: 0

## 2024-12-02 NOTE — PROGRESS NOTES
Medicare Annual Wellness Visit    Sherif Galvan Jr. is here for Medicare AWV (Patient is here for a Medicare Wellness.), Discuss Labs, and Medication Refill    Assessment & Plan   Medicare annual wellness visit, subsequent  Generalized anxiety disorder  -     LORazepam (ATIVAN) 0.5 MG tablet; Take 1 tablet by mouth every 8 hours as needed for Anxiety for up to 180 days. Max Daily Amount: 1.5 mg, Disp-30 tablet, R-0Normal  Radiculopathy, cervical region  -     carisoprodol (SOMA) 350 MG tablet; Take 1 tablet by mouth daily as needed for Muscle spasms for up to 90 days., Disp-30 tablet, R-2Normal  Mixed hyperlipidemia  DUDLEY (obstructive sleep apnea)    1. Annual Wellness Visit  - Mr. Sherif Galvan presents for his annual wellness visit. Labs show improvement in vitamin D levels from 10 to 40.7. PSA, CMP, and CBC are normal. Cholesterol is slightly elevated but is balanced by high HDL (86) with a ratio of 2.5. Blood pressure and BMI are within normal limits, with a reported weight of 160 lbs. The patient feels generally well but experiences afternoon fatigue. He is retired, maintains an active lifestyle with treadmill walking, and has completed a colonoscopy.  - Plan: Continue vitamin D supplementation at 50,000 IU weekly. Maintain the current medication regimen with refills provided. Encourage the continuation of the treadmill walking exercise routine.    2. Obstructive Sleep Apnea  - The patient reports improved sleep since the CPAP adjustment on June 20th, with no more headaches or backaches and a significant difference noted, especially during long drives. The CPAP has been in use for approximately 6 months.  - Plan: Continue the current CPAP therapy. Monitor for ongoing improvement in symptoms and overall health.      Recommendations for Preventive Services Due: see orders and patient instructions/AVS.  Recommended screening schedule for the next 5-10 years is provided to the patient in written form: see

## 2024-12-06 DIAGNOSIS — N40.1 BENIGN PROSTATIC HYPERPLASIA WITH NOCTURIA: ICD-10-CM

## 2024-12-06 DIAGNOSIS — R35.1 BENIGN PROSTATIC HYPERPLASIA WITH NOCTURIA: ICD-10-CM

## 2024-12-06 RX ORDER — TAMSULOSIN HYDROCHLORIDE 0.4 MG/1
CAPSULE ORAL
Qty: 90 CAPSULE | Refills: 3 | OUTPATIENT
Start: 2024-12-06

## 2024-12-12 DIAGNOSIS — R35.1 BENIGN PROSTATIC HYPERPLASIA WITH NOCTURIA: ICD-10-CM

## 2024-12-12 DIAGNOSIS — N40.1 BENIGN PROSTATIC HYPERPLASIA WITH NOCTURIA: ICD-10-CM

## 2024-12-12 RX ORDER — TAMSULOSIN HYDROCHLORIDE 0.4 MG/1
CAPSULE ORAL
Qty: 90 CAPSULE | Refills: 3 | Status: SHIPPED | OUTPATIENT
Start: 2024-12-12

## 2025-01-14 NOTE — PROGRESS NOTES
Housing Stability Vital Sign     Unable to Pay for Housing in the Last Year: Not on file     Number of Times Moved in the Last Year: Not on file     Homeless in the Last Year: No         Family History   Problem Relation Age of Onset    Diabetes Father         po meds    Hypertension Father     Psychiatric Disorder Mother     Heart Disease Mother         no MI- 1 stent    No Known Problems Sister     Cancer Paternal Grandfather         prostate         Allergies   Allergen Reactions    Chlorzoxazone Itching     nausea         Current Outpatient Medications   Medication Sig    tamsulosin (FLOMAX) 0.4 MG capsule TAKE 1 CAPSULE BY MOUTH EVERY DAY    LORazepam (ATIVAN) 0.5 MG tablet Take 1 tablet by mouth every 8 hours as needed for Anxiety for up to 180 days. Max Daily Amount: 1.5 mg    carisoprodol (SOMA) 350 MG tablet Take 1 tablet by mouth daily as needed for Muscle spasms for up to 90 days.    ergocalciferol (DRISDOL) 1.25 MG (15408 UT) capsule Take 1 capsule by mouth once a week    NONFORMULARY inspire    ibuprofen (ADVIL;MOTRIN) 800 MG tablet Take 1 tablet by mouth every 8 hours as needed for Pain    meloxicam (MOBIC) 15 MG tablet Take 1 tablet by mouth daily    lansoprazole (PREVACID) 30 MG delayed release capsule TAKE 1 CAPSULE EVERY DAY BEFORE BREAKFAST    CPAP Machine MISC by Does not apply route (Patient not taking: Reported on 9/17/2024)     No current facility-administered medications for this visit.           REVIEW OF SYSTEMS:     CONSTITUTIONAL:   There is Negative history of fever, chills, night sweats.   Patient is  Negativefor weight loss, they are  Negative for  weight gain.  If that he is on the inspire device.  Insomnia is   under control.      CARDIAC:   No chest pain, pressure, discomfort, palpitations, orthopnea, murmurs, or edema.     GI:   No dysphagia, heartburn reflux, nausea/vomiting, diarrhea, abdominal pain, or bleeding.     NEURO:    There is no history of AMS,   decreased level of

## 2025-01-15 ENCOUNTER — OFFICE VISIT (OUTPATIENT)
Dept: SLEEP MEDICINE | Age: 60
End: 2025-01-15

## 2025-01-15 VITALS
OXYGEN SATURATION: 96 % | DIASTOLIC BLOOD PRESSURE: 82 MMHG | HEIGHT: 67 IN | WEIGHT: 163 LBS | BODY MASS INDEX: 25.58 KG/M2 | HEART RATE: 76 BPM | TEMPERATURE: 97.5 F | SYSTOLIC BLOOD PRESSURE: 125 MMHG | RESPIRATION RATE: 19 BRPM

## 2025-01-15 DIAGNOSIS — R51.9 NONINTRACTABLE HEADACHE, UNSPECIFIED CHRONICITY PATTERN, UNSPECIFIED HEADACHE TYPE: ICD-10-CM

## 2025-01-15 DIAGNOSIS — G47.33 OSA (OBSTRUCTIVE SLEEP APNEA): Primary | ICD-10-CM

## 2025-01-15 DIAGNOSIS — R06.83 SNORING: ICD-10-CM

## 2025-01-15 DIAGNOSIS — R09.02 HYPOXEMIA: ICD-10-CM

## 2025-01-15 DIAGNOSIS — G47.00 INSOMNIA, UNSPECIFIED TYPE: ICD-10-CM

## 2025-01-15 ASSESSMENT — SLEEP AND FATIGUE QUESTIONNAIRES
HOW LIKELY ARE YOU TO NOD OFF OR FALL ASLEEP WHILE SITTING AND TALKING TO SOMEONE: SLIGHT CHANCE OF DOZING
HOW LIKELY ARE YOU TO NOD OFF OR FALL ASLEEP WHILE WATCHING TV: MODERATE CHANCE OF DOZING
HOW LIKELY ARE YOU TO NOD OFF OR FALL ASLEEP IN A CAR, WHILE STOPPED FOR A FEW MINUTES IN TRAFFIC: WOULD NEVER DOZE
HOW LIKELY ARE YOU TO NOD OFF OR FALL ASLEEP WHILE LYING DOWN TO REST IN THE AFTERNOON WHEN CIRCUMSTANCES PERMIT: MODERATE CHANCE OF DOZING
HOW LIKELY ARE YOU TO NOD OFF OR FALL ASLEEP WHILE SITTING AND READING: SLIGHT CHANCE OF DOZING
HOW LIKELY ARE YOU TO NOD OFF OR FALL ASLEEP WHEN YOU ARE A PASSENGER IN A CAR FOR AN HOUR WITHOUT A BREAK: SLIGHT CHANCE OF DOZING
HOW LIKELY ARE YOU TO NOD OFF OR FALL ASLEEP WHILE SITTING INACTIVE IN A PUBLIC PLACE: WOULD NEVER DOZE
HOW LIKELY ARE YOU TO NOD OFF OR FALL ASLEEP WHILE SITTING QUIETLY AFTER LUNCH WITHOUT ALCOHOL: SLIGHT CHANCE OF DOZING
ESS TOTAL SCORE: 8

## 2025-01-17 DIAGNOSIS — N40.1 BENIGN PROSTATIC HYPERPLASIA WITH NOCTURIA: ICD-10-CM

## 2025-01-17 DIAGNOSIS — R35.1 BENIGN PROSTATIC HYPERPLASIA WITH NOCTURIA: ICD-10-CM

## 2025-01-17 RX ORDER — LANSOPRAZOLE 30 MG/1
CAPSULE, DELAYED RELEASE ORAL
Qty: 90 CAPSULE | Refills: 0 | OUTPATIENT
Start: 2025-01-17

## 2025-01-17 RX ORDER — TAMSULOSIN HYDROCHLORIDE 0.4 MG/1
CAPSULE ORAL
Qty: 90 CAPSULE | Refills: 0 | OUTPATIENT
Start: 2025-01-17

## 2025-01-21 DIAGNOSIS — K21.00 GASTROESOPHAGEAL REFLUX DISEASE WITH ESOPHAGITIS WITHOUT HEMORRHAGE: Primary | ICD-10-CM

## 2025-01-21 RX ORDER — LANSOPRAZOLE 30 MG/1
30 CAPSULE, DELAYED RELEASE ORAL DAILY
Qty: 90 CAPSULE | Refills: 1 | Status: SHIPPED | OUTPATIENT
Start: 2025-01-21

## 2025-04-03 ENCOUNTER — PATIENT MESSAGE (OUTPATIENT)
Dept: PRIMARY CARE CLINIC | Facility: CLINIC | Age: 60
End: 2025-04-03

## 2025-04-07 RX ORDER — ERGOCALCIFEROL 1.25 MG/1
50000 CAPSULE ORAL WEEKLY
Qty: 4 CAPSULE | Refills: 5 | Status: SHIPPED | OUTPATIENT
Start: 2025-04-07

## 2025-07-07 ASSESSMENT — SLEEP AND FATIGUE QUESTIONNAIRES
HOW LIKELY ARE YOU TO NOD OFF OR FALL ASLEEP IN A CAR, WHILE STOPPED FOR A FEW MINUTES IN TRAFFIC: WOULD NEVER DOZE
HOW LIKELY ARE YOU TO NOD OFF OR FALL ASLEEP WHILE SITTING INACTIVE IN A PUBLIC PLACE: SLIGHT CHANCE OF DOZING
ESS TOTAL SCORE: 11
HOW LIKELY ARE YOU TO NOD OFF OR FALL ASLEEP WHILE SITTING QUIETLY AFTER LUNCH WITHOUT ALCOHOL: MODERATE CHANCE OF DOZING
HOW LIKELY ARE YOU TO NOD OFF OR FALL ASLEEP WHEN YOU ARE A PASSENGER IN A CAR FOR AN HOUR WITHOUT A BREAK: SLIGHT CHANCE OF DOZING
HOW LIKELY ARE YOU TO NOD OFF OR FALL ASLEEP WHEN YOU ARE A PASSENGER IN A CAR FOR AN HOUR WITHOUT A BREAK: SLIGHT CHANCE OF DOZING
HOW LIKELY ARE YOU TO NOD OFF OR FALL ASLEEP WHILE WATCHING TV: MODERATE CHANCE OF DOZING
HOW LIKELY ARE YOU TO NOD OFF OR FALL ASLEEP WHILE SITTING QUIETLY AFTER LUNCH WITHOUT ALCOHOL: MODERATE CHANCE OF DOZING
HOW LIKELY ARE YOU TO NOD OFF OR FALL ASLEEP WHILE WATCHING TV: MODERATE CHANCE OF DOZING
HOW LIKELY ARE YOU TO NOD OFF OR FALL ASLEEP WHILE SITTING INACTIVE IN A PUBLIC PLACE: SLIGHT CHANCE OF DOZING
HOW LIKELY ARE YOU TO NOD OFF OR FALL ASLEEP WHILE SITTING AND READING: MODERATE CHANCE OF DOZING
HOW LIKELY ARE YOU TO NOD OFF OR FALL ASLEEP IN A CAR, WHILE STOPPED FOR A FEW MINUTES IN TRAFFIC: WOULD NEVER DOZE
HOW LIKELY ARE YOU TO NOD OFF OR FALL ASLEEP WHILE SITTING AND TALKING TO SOMEONE: SLIGHT CHANCE OF DOZING
HOW LIKELY ARE YOU TO NOD OFF OR FALL ASLEEP WHILE SITTING AND READING: MODERATE CHANCE OF DOZING
HOW LIKELY ARE YOU TO NOD OFF OR FALL ASLEEP WHILE LYING DOWN TO REST IN THE AFTERNOON WHEN CIRCUMSTANCES PERMIT: MODERATE CHANCE OF DOZING
HOW LIKELY ARE YOU TO NOD OFF OR FALL ASLEEP WHILE LYING DOWN TO REST IN THE AFTERNOON WHEN CIRCUMSTANCES PERMIT: MODERATE CHANCE OF DOZING
HOW LIKELY ARE YOU TO NOD OFF OR FALL ASLEEP WHILE SITTING AND TALKING TO SOMEONE: SLIGHT CHANCE OF DOZING

## 2025-07-08 NOTE — PROGRESS NOTES
[facility]  [address]  [place] [address]  [phone]    Patient Name:  Sherif Galvan Jr.  YOB: 1965      Office Visit 7/10/2025    Chief Complaint   Patient presents with    Sleep Apnea    Inspire     No specialty comments available.       HISTORY OF PRESENT ILLNESS:    This pleasant gentleman came in today for a F/u regarding Inspire device.     To recap:  Mr. Galvan was originally diagnosed with severe obstructive sleep apnea in 2022 with an AHI of 47 and a low SPO2 of 76%.  He has been on BiPAP ST at 12/8 with a rate of 12.  He was using the device but had issues wih the mask and constant conflict with in resulting in insomnia.     Device implanted 5/20/24 and activated on 6/20/24.  Device adjusted due to issues with his jaw.  We have had to adjust the device a few times and last time was essentially at the following settings except for the voltage being at 1.2.                At this time:  Patient indicated did go up to the voltage of 1.4 then went back down to 1.3 and I did look over his data and he did it around June 7.  He has been using his device 89 and 90 days 89 days more than 4 hours.  Average use 6 hours and 28 minutes, but there are days he is over the 8-hour limits and finds the device is off in the morning.  He does report recently his wife told him that he was still snoring with the device.  But he is feeling a little bit better.    Last time he had some issues with neck pain but is not reporting about that right now.      He reports the remote also does not always connect and is wondering if I can check it    His [place] score remains at 11/24 7/7/2025     9:56 AM 1/15/2025     9:06 AM 11/5/2024     9:05 AM 9/3/2024     8:08 AM 6/20/2024    10:48 AM 10/17/2023     8:49 AM 6/30/2023     1:09 PM   Sleep Medicine   Sitting and reading 2 1 0 1 2 3 0   Watching TV 2 2 2 2 2 3 1   Sitting, inactive in a public place (e.g. a theatre or a

## 2025-07-10 ENCOUNTER — OFFICE VISIT (OUTPATIENT)
Dept: SLEEP MEDICINE | Age: 60
End: 2025-07-10
Payer: MEDICARE

## 2025-07-10 VITALS
HEART RATE: 62 BPM | OXYGEN SATURATION: 97 % | WEIGHT: 160 LBS | SYSTOLIC BLOOD PRESSURE: 147 MMHG | DIASTOLIC BLOOD PRESSURE: 82 MMHG | HEIGHT: 67 IN | RESPIRATION RATE: 14 BRPM | BODY MASS INDEX: 25.11 KG/M2

## 2025-07-10 DIAGNOSIS — G47.00 INSOMNIA, UNSPECIFIED TYPE: ICD-10-CM

## 2025-07-10 DIAGNOSIS — R51.9 NONINTRACTABLE HEADACHE, UNSPECIFIED CHRONICITY PATTERN, UNSPECIFIED HEADACHE TYPE: ICD-10-CM

## 2025-07-10 DIAGNOSIS — G47.10 HYPERSOMNIA, UNSPECIFIED: ICD-10-CM

## 2025-07-10 DIAGNOSIS — R06.83 SNORING: ICD-10-CM

## 2025-07-10 DIAGNOSIS — G47.33 OSA (OBSTRUCTIVE SLEEP APNEA): Primary | ICD-10-CM

## 2025-07-10 PROCEDURE — 3017F COLORECTAL CA SCREEN DOC REV: CPT | Performed by: INTERNAL MEDICINE

## 2025-07-10 PROCEDURE — 99214 OFFICE O/P EST MOD 30 MIN: CPT | Performed by: INTERNAL MEDICINE

## 2025-07-10 PROCEDURE — G8427 DOCREV CUR MEDS BY ELIG CLIN: HCPCS | Performed by: INTERNAL MEDICINE

## 2025-07-10 PROCEDURE — 95977 ALYS CPLX CN NPGT PRGRMG: CPT | Performed by: INTERNAL MEDICINE

## 2025-07-10 PROCEDURE — G8419 CALC BMI OUT NRM PARAM NOF/U: HCPCS | Performed by: INTERNAL MEDICINE

## 2025-07-10 PROCEDURE — 1036F TOBACCO NON-USER: CPT | Performed by: INTERNAL MEDICINE
